# Patient Record
Sex: MALE | Race: WHITE | NOT HISPANIC OR LATINO | Employment: OTHER | URBAN - METROPOLITAN AREA
[De-identification: names, ages, dates, MRNs, and addresses within clinical notes are randomized per-mention and may not be internally consistent; named-entity substitution may affect disease eponyms.]

---

## 2018-03-15 ENCOUNTER — HOSPITAL ENCOUNTER (EMERGENCY)
Facility: HOSPITAL | Age: 53
Discharge: HOME | End: 2018-03-16
Attending: EMERGENCY MEDICINE | Admitting: EMERGENCY MEDICINE
Payer: COMMERCIAL

## 2018-03-15 ENCOUNTER — APPOINTMENT (EMERGENCY)
Dept: CT IMAGING | Facility: HOSPITAL | Age: 53
End: 2018-03-15
Payer: COMMERCIAL

## 2018-03-15 DIAGNOSIS — N20.0 NEPHROLITHIASIS: Primary | ICD-10-CM

## 2018-03-15 LAB
ANION GAP SERPL CALCULATED.3IONS-SCNC: 10 MMOL/L (ref 4–13)
BACTERIA UR QL AUTO: ABNORMAL /HPF
BASOPHILS # BLD AUTO: 0.06 THOUSANDS/ΜL (ref 0–0.1)
BASOPHILS NFR BLD AUTO: 1 % (ref 0–1)
BILIRUB UR QL STRIP: NEGATIVE
BUN SERPL-MCNC: 19 MG/DL (ref 5–25)
CALCIUM SERPL-MCNC: 9.9 MG/DL (ref 8.3–10.1)
CHLORIDE SERPL-SCNC: 106 MMOL/L (ref 100–108)
CLARITY UR: CLEAR
CO2 SERPL-SCNC: 25 MMOL/L (ref 21–32)
COLOR UR: YELLOW
CREAT SERPL-MCNC: 1.03 MG/DL (ref 0.6–1.3)
EOSINOPHIL # BLD AUTO: 0.16 THOUSAND/ΜL (ref 0–0.61)
EOSINOPHIL NFR BLD AUTO: 2 % (ref 0–6)
ERYTHROCYTE [DISTWIDTH] IN BLOOD BY AUTOMATED COUNT: 13 % (ref 11.6–15.1)
GFR SERPL CREATININE-BSD FRML MDRD: 84 ML/MIN/1.73SQ M
GLUCOSE SERPL-MCNC: 109 MG/DL (ref 65–140)
GLUCOSE UR STRIP-MCNC: NEGATIVE MG/DL
HCT VFR BLD AUTO: 42.7 % (ref 36.5–49.3)
HGB BLD-MCNC: 14.5 G/DL (ref 12–17)
HGB UR QL STRIP.AUTO: ABNORMAL
KETONES UR STRIP-MCNC: NEGATIVE MG/DL
LEUKOCYTE ESTERASE UR QL STRIP: NEGATIVE
LYMPHOCYTES # BLD AUTO: 2.04 THOUSANDS/ΜL (ref 0.6–4.47)
LYMPHOCYTES NFR BLD AUTO: 22 % (ref 14–44)
MCH RBC QN AUTO: 30.5 PG (ref 26.8–34.3)
MCHC RBC AUTO-ENTMCNC: 34 G/DL (ref 31.4–37.4)
MCV RBC AUTO: 90 FL (ref 82–98)
MONOCYTES # BLD AUTO: 0.98 THOUSAND/ΜL (ref 0.17–1.22)
MONOCYTES NFR BLD AUTO: 11 % (ref 4–12)
MUCOUS THREADS UR QL AUTO: ABNORMAL
NEUTROPHILS # BLD AUTO: 6 THOUSANDS/ΜL (ref 1.85–7.62)
NEUTS SEG NFR BLD AUTO: 65 % (ref 43–75)
NITRITE UR QL STRIP: NEGATIVE
NON-SQ EPI CELLS URNS QL MICRO: ABNORMAL /HPF
NRBC BLD AUTO-RTO: 0 /100 WBCS
PH UR STRIP.AUTO: 6 [PH] (ref 4.5–8)
PLATELET # BLD AUTO: 266 THOUSANDS/UL (ref 149–390)
PMV BLD AUTO: 8.8 FL (ref 8.9–12.7)
POTASSIUM SERPL-SCNC: 3.7 MMOL/L (ref 3.5–5.3)
PROT UR STRIP-MCNC: ABNORMAL MG/DL
RBC # BLD AUTO: 4.75 MILLION/UL (ref 3.88–5.62)
RBC #/AREA URNS AUTO: ABNORMAL /HPF
SODIUM SERPL-SCNC: 141 MMOL/L (ref 136–145)
SP GR UR STRIP.AUTO: 1.02 (ref 1–1.03)
UROBILINOGEN UR QL STRIP.AUTO: 0.2 E.U./DL
WBC # BLD AUTO: 9.27 THOUSAND/UL (ref 4.31–10.16)
WBC #/AREA URNS AUTO: ABNORMAL /HPF

## 2018-03-15 PROCEDURE — 96361 HYDRATE IV INFUSION ADD-ON: CPT

## 2018-03-15 PROCEDURE — 80048 BASIC METABOLIC PNL TOTAL CA: CPT | Performed by: EMERGENCY MEDICINE

## 2018-03-15 PROCEDURE — 74176 CT ABD & PELVIS W/O CONTRAST: CPT

## 2018-03-15 PROCEDURE — 81001 URINALYSIS AUTO W/SCOPE: CPT | Performed by: EMERGENCY MEDICINE

## 2018-03-15 PROCEDURE — 85025 COMPLETE CBC W/AUTO DIFF WBC: CPT | Performed by: EMERGENCY MEDICINE

## 2018-03-15 PROCEDURE — 36415 COLL VENOUS BLD VENIPUNCTURE: CPT | Performed by: EMERGENCY MEDICINE

## 2018-03-15 PROCEDURE — 96360 HYDRATION IV INFUSION INIT: CPT

## 2018-03-15 RX ORDER — SODIUM CHLORIDE 9 MG/ML
125 INJECTION, SOLUTION INTRAVENOUS CONTINUOUS
Status: DISCONTINUED | OUTPATIENT
Start: 2018-03-15 | End: 2018-03-16 | Stop reason: HOSPADM

## 2018-03-15 RX ORDER — ONDANSETRON 2 MG/ML
4 INJECTION INTRAMUSCULAR; INTRAVENOUS ONCE
Status: DISCONTINUED | OUTPATIENT
Start: 2018-03-15 | End: 2018-03-16 | Stop reason: HOSPADM

## 2018-03-15 RX ORDER — KETOROLAC TROMETHAMINE 30 MG/ML
15 INJECTION, SOLUTION INTRAMUSCULAR; INTRAVENOUS ONCE
Status: DISCONTINUED | OUTPATIENT
Start: 2018-03-15 | End: 2018-03-16 | Stop reason: HOSPADM

## 2018-03-15 RX ADMIN — SODIUM CHLORIDE 125 ML/HR: 0.9 INJECTION, SOLUTION INTRAVENOUS at 22:38

## 2018-03-16 VITALS
HEIGHT: 72 IN | SYSTOLIC BLOOD PRESSURE: 120 MMHG | HEART RATE: 56 BPM | RESPIRATION RATE: 15 BRPM | BODY MASS INDEX: 25.73 KG/M2 | WEIGHT: 190 LBS | TEMPERATURE: 98.9 F | OXYGEN SATURATION: 97 % | DIASTOLIC BLOOD PRESSURE: 81 MMHG

## 2018-03-16 PROCEDURE — 99284 EMERGENCY DEPT VISIT MOD MDM: CPT

## 2018-03-16 RX ORDER — TAMSULOSIN HYDROCHLORIDE 0.4 MG/1
0.4 CAPSULE ORAL
Qty: 5 CAPSULE | Refills: 0 | Status: ON HOLD | OUTPATIENT
Start: 2018-03-16 | End: 2018-04-05 | Stop reason: ALTCHOICE

## 2018-03-16 RX ORDER — IBUPROFEN 600 MG/1
600 TABLET ORAL EVERY 6 HOURS PRN
Qty: 30 TABLET | Refills: 0 | Status: SHIPPED | OUTPATIENT
Start: 2018-03-16 | End: 2018-03-26

## 2018-03-16 RX ORDER — ONDANSETRON 4 MG/1
4 TABLET, FILM COATED ORAL EVERY 8 HOURS PRN
Qty: 12 TABLET | Refills: 0 | Status: ON HOLD | OUTPATIENT
Start: 2018-03-16 | End: 2018-04-05 | Stop reason: ALTCHOICE

## 2018-03-16 RX ORDER — OXYCODONE HYDROCHLORIDE AND ACETAMINOPHEN 5; 325 MG/1; MG/1
1 TABLET ORAL EVERY 4 HOURS PRN
Qty: 12 TABLET | Refills: 0 | Status: SHIPPED | OUTPATIENT
Start: 2018-03-16 | End: 2018-03-26
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
## 2018-03-16 NOTE — ED NOTES
Patient transported to [facility] 02 , [address] [facility]  03/15/18 4163

## 2018-03-16 NOTE — DISCHARGE INSTRUCTIONS
Kidney Stones   WHAT YOU NEED TO KNOW:   Kidney stones form in the urinary system when the water and waste in your urine are out of balance  When this happens, certain types of waste crystals separate from the urine  The crystals build up and form kidney stones  You may have 1 or more kidney stones  DISCHARGE INSTRUCTIONS:   Return to the emergency department if:   · You have vomiting that is not relieved by medicine  Contact your healthcare provider if:   · You have a fever  · You have trouble passing urine  · You see blood in your urine  · You have severe pain  · You have any questions or concerns about your condition or care  Medicines:   · NSAIDs , such as ibuprofen, help decrease swelling, pain, and fever  This medicine is available with or without a doctor's order  NSAIDs can cause stomach bleeding or kidney problems in certain people  If you take blood thinner medicine, always ask your healthcare provider if NSAIDs are safe for you  Always read the medicine label and follow directions  · Prescription medicine  may be given  Ask how to take this medicine safely  · Medicines  to balance your electrolytes may be needed  · Take your medicine as directed  Contact your healthcare provider if you think your medicine is not helping or if you have side effects  Tell him or her if you are allergic to any medicine  Keep a list of the medicines, vitamins, and herbs you take  Include the amounts, and when and why you take them  Bring the list or the pill bottles to follow-up visits  Carry your medicine list with you in case of an emergency  Follow up with your healthcare provider as directed: You may need to return for more tests  Write down your questions so you remember to ask them during your visits  Self-care:   · Drink plenty of liquids  Your healthcare provider may tell you to drink at least 8 to 12 (eight-ounce) cups of liquids each day   This helps flush out the kidney stones when you urinate  Water is the best liquid to drink  · Strain your urine every time you go to the bathroom  Urinate through a strainer or a piece of thin cloth to catch the stones  Take the stones to your healthcare provider so they can be sent to the lab for tests  This will help your healthcare providers plan the best treatment for you  · Eat a variety of healthy foods  Healthy foods include fruits, vegetables, whole-grain breads, low-fat dairy products, beans, and fish  You may need to limit how much sodium (salt) or protein you eat  Ask for information about the best foods for you  · Stay active  Your stones may pass more easily by if you stay active  Ask about the best activities for you  After you pass your kidney stones:  Once you have passed your kidney stones, your healthcare provider may  order a 24-hour urine test  Results from a 24-hour urine test will help your healthcare provider plan ways to prevent more stones from forming  If you are told to do a 24-hour test, your healthcare provider will give you more instructions  © 2017 [facility] [facility] Information is for End User's use only and may not be sold, redistributed or otherwise used for commercial purposes  All illustrations and images included in CareNotes® are the copyrighted property of A D [employer] M [employer]  or [employer]  The above information is an  only  It is not intended as medical advice for individual conditions or treatments  Talk to your doctor, nurse or pharmacist before following any medical regimen to see if it is safe and effective for you  How to Strain Your Urine   WHAT YOU NEED TO KNOW:   Urinate into a strainer (funnel with a fine mesh on the bottom) or glass jar to collect kidney stones  DISCHARGE INSTRUCTIONS:   Medicines:   · Pain medicine: You may be given medicine to take away or decrease pain   Do not wait until the pain is severe before you take your medicine  · NSAIDs:  These medicines decrease swelling, pain, and fever  NSAIDs are available without a doctor's order  Ask which medicine is right for you  Ask how much to take and when to take it  Take as directed  NSAIDs can cause stomach bleeding and kidney problems if not taken correctly  · Nausea medicine: This medicine calms your stomach and prevents or controls vomiting  · Take your medicine as directed  Contact your healthcare provider if you think your medicine is not helping or if you have side effects  Tell him of her if you are allergic to any medicine  Keep a list of the medicines, vitamins, and herbs you take  Include the amounts, and when and why you take them  Bring the list or the pill bottles to follow-up visits  Carry your medicine list with you in case of an emergency  Drink liquids as directed:  Drink about 3 liters of liquids each day, or as directed  That equals about 12 glasses of water or fruit juice  Half of your total daily liquids should be water  Limit coffee, tea, and soda to 2 cups daily  Your urine will be pale and clear if you are drinking enough liquid  Self-care:   · Activity:  Exercise, such as walking, may help decrease your pain  · Avoid heat:  Heat may cause you to sweat, urinate less, and become dehydrated  Follow up with your healthcare provider or urologist as directed:  Write down your questions so you remember to ask them during your visits  Contact your healthcare provider or urologist if:   · You have a fever and chills  · Your urine looks cloudy or has a bad smell  · You have burning pain when you urinate  · You have trouble urinating  · You are vomiting and it does not get better, even after you take medicine  · You have questions or concerns about your condition or care  Return to the emergency department if:   · You are not able to urinate  · You have severe pain in your lower abdomen or side      · Your heart flutters or beats faster than usual   © 2017 [address] [facility] Information is for End User's use only and may not be sold, redistributed or otherwise used for commercial purposes  All illustrations and images included in CareNotes® are the copyrighted property of A D A M , Inc  or [employer]  The above information is an  only  It is not intended as medical advice for individual conditions or treatments  Talk to your doctor, nurse or pharmacist before following any medical regimen to see if it is safe and effective for you

## 2018-03-20 ENCOUNTER — OFFICE VISIT (OUTPATIENT)
Dept: UROLOGY | Facility: CLINIC | Age: 53
End: 2018-03-20
Payer: COMMERCIAL

## 2018-03-20 VITALS
HEART RATE: 62 BPM | BODY MASS INDEX: 26.28 KG/M2 | HEIGHT: 72 IN | SYSTOLIC BLOOD PRESSURE: 126 MMHG | DIASTOLIC BLOOD PRESSURE: 76 MMHG | WEIGHT: 194 LBS

## 2018-03-20 DIAGNOSIS — N20.0 NEPHROLITHIASIS: Primary | ICD-10-CM

## 2018-03-20 LAB
SL AMB  POCT GLUCOSE, UA: ABNORMAL
SL AMB LEUKOCYTE ESTERASE,UA: ABNORMAL
SL AMB POCT BILIRUBIN,UA: ABNORMAL
SL AMB POCT BLOOD,UA: ABNORMAL
SL AMB POCT CLARITY,UA: CLEAR
SL AMB POCT COLOR,UA: ABNORMAL
SL AMB POCT KETONES,UA: ABNORMAL
SL AMB POCT NITRITE,UA: ABNORMAL
SL AMB POCT PH,UA: 5
SL AMB POCT SPECIFIC GRAVITY,UA: 1.03
SL AMB POCT URINE PROTEIN: ABNORMAL
SL AMB POCT UROBILINOGEN: ABNORMAL

## 2018-03-20 PROCEDURE — 81002 URINALYSIS NONAUTO W/O SCOPE: CPT | Performed by: UROLOGY

## 2018-03-20 PROCEDURE — 99244 OFF/OP CNSLTJ NEW/EST MOD 40: CPT | Performed by: UROLOGY

## 2018-03-20 RX ORDER — MULTIVIT WITH MINERALS/LUTEIN
1000 TABLET ORAL
COMMUNITY
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
## 2018-04-02 ENCOUNTER — TELEPHONE (OUTPATIENT)
Dept: UROLOGY | Facility: CLINIC | Age: 53
End: 2018-04-02

## 2018-04-02 DIAGNOSIS — N20.1 URETERAL STONE: Primary | ICD-10-CM

## 2018-04-02 RX ORDER — HYDROCODONE BITARTRATE AND ACETAMINOPHEN 5; 325 MG/1; MG/1
1 TABLET ORAL EVERY 6 HOURS PRN
Qty: 30 TABLET | Refills: 0 | Status: SHIPPED | OUTPATIENT
Start: 2018-04-02 | End: 2018-04-12

## 2018-04-02 NOTE — TELEPHONE ENCOUNTER
Called and spoke to patient and informed of all recommendations as outlined by Ben Brower and advised him script is here in [facility] office for  today  Provided patient with address of the [facility] office and he will call me if unable to  script today

## 2018-04-02 NOTE — TELEPHONE ENCOUNTER
Yes, patient can have prescription for Norco for his breakthrough pain with known obstructing stone  Patient will have to  from [facility] office  Please encourage fluids, NSAIDs/heating pad for mild-mod pain, norco for breakthrough pain, and zofran for nausea  Please provide ER precautions as well  Thank you

## 2018-04-02 NOTE — TELEPHONE ENCOUNTER
Abdi patient,  Returned call from patient,  He is scheduled for surgery for stone on 4/12/18 with Dr Sudhir Martinez, patient is reporting he is having increased intermittent left flank pain which radiates to front of abdomen,  Pain is not relieved by Ibuprofen  He has some intermittent nausea and has a script already for Zofran which he has not had to use  No fever  He is on Flomax  He is calling to see if he can get a script for stronger pain medication  Advised patient I will send message to our physician assistant to review and advised him in narcotic pain reliever is prescribed script would need to be picked up in the [facility] office today as there are no providers in [facility] today  Advised patient I will call him back with recommendation

## 2018-04-04 ENCOUNTER — ANESTHESIA EVENT (OUTPATIENT)
Dept: PERIOP | Facility: HOSPITAL | Age: 53
End: 2018-04-04
Payer: COMMERCIAL

## 2018-04-04 NOTE — PRE-PROCEDURE INSTRUCTIONS
Pre-Surgery Instructions:   Medication Instructions    Ascorbic Acid (VITAMIN C) 1000 MG tablet Patient was instructed by Physician and understands   tamsulosin (FLOMAX) 0 4 mg Patient was instructed by Physician and understands

## 2018-04-04 NOTE — ANESTHESIA PREPROCEDURE EVALUATION
Review of Systems/Medical History  Patient summary reviewed        Cardiovascular  Negative cardio ROS    Pulmonary  Negative pulmonary ROS        GI/Hepatic  Negative GI/hepatic ROS          Kidney stones,        Endo/Other  Negative endo/other ROS      GYN  Negative gynecology ROS          Hematology  Negative hematology ROS      Musculoskeletal  Negative musculoskeletal ROS        Neurology  Negative neurology ROS      Psychology   Negative psychology ROS              Physical Exam    Airway    Mallampati score: II  TM Distance: >3 FB  Neck ROM: full     Dental       Cardiovascular  Comment: Negative ROS, Cardiovascular exam normal    Pulmonary  Pulmonary exam normal     Other Findings        Anesthesia Plan  ASA Score- 2     Anesthesia Type- general with ASA Monitors  Additional Monitors:   Airway Plan:         Plan Factors-    Induction- intravenous  Postoperative Plan-     Informed Consent- Anesthetic plan and risks discussed with patient  I personally reviewed this patient with the CRNA  Discussed and agreed on the Anesthesia Plan with the CRNA  Britany Holm

## 2018-04-05 ENCOUNTER — TELEPHONE (OUTPATIENT)
Dept: UROLOGY | Facility: CLINIC | Age: 53
End: 2018-04-05

## 2018-04-05 ENCOUNTER — ANESTHESIA (OUTPATIENT)
Dept: PERIOP | Facility: HOSPITAL | Age: 53
End: 2018-04-05
Payer: COMMERCIAL

## 2018-04-05 ENCOUNTER — APPOINTMENT (OUTPATIENT)
Dept: RADIOLOGY | Facility: HOSPITAL | Age: 53
End: 2018-04-05
Payer: COMMERCIAL

## 2018-04-05 ENCOUNTER — HOSPITAL ENCOUNTER (OUTPATIENT)
Facility: HOSPITAL | Age: 53
Setting detail: OUTPATIENT SURGERY
Discharge: HOME | End: 2018-04-05
Attending: UROLOGY | Admitting: UROLOGY
Payer: COMMERCIAL

## 2018-04-05 VITALS
WEIGHT: 193 LBS | RESPIRATION RATE: 20 BRPM | HEART RATE: 69 BPM | HEIGHT: 72 IN | OXYGEN SATURATION: 97 % | DIASTOLIC BLOOD PRESSURE: 69 MMHG | SYSTOLIC BLOOD PRESSURE: 125 MMHG | BODY MASS INDEX: 26.14 KG/M2 | TEMPERATURE: 96.9 F

## 2018-04-05 DIAGNOSIS — N20.0 NEPHROLITHIASIS: ICD-10-CM

## 2018-04-05 DIAGNOSIS — N20.0 KIDNEY STONE: Primary | ICD-10-CM

## 2018-04-05 PROCEDURE — 82360 CALCULUS ASSAY QUANT: CPT | Performed by: UROLOGY

## 2018-04-05 PROCEDURE — 74420 UROGRAPHY RTRGR +-KUB: CPT

## 2018-04-05 PROCEDURE — C1769 GUIDE WIRE: HCPCS | Performed by: UROLOGY

## 2018-04-05 PROCEDURE — 52356 CYSTO/URETERO W/LITHOTRIPSY: CPT | Performed by: UROLOGY

## 2018-04-05 PROCEDURE — C2617 STENT, NON-COR, TEM W/O DEL: HCPCS | Performed by: UROLOGY

## 2018-04-05 DEVICE — INLAY OPTIMA URETERAL STENT W/O GUIDEWIRE
Type: IMPLANTABLE DEVICE | Site: URETER | Status: FUNCTIONAL
Brand: BARD® INLAY OPTIMA® URETERAL STENT

## 2018-04-05 RX ORDER — PHENAZOPYRIDINE HYDROCHLORIDE 200 MG/1
200 TABLET, FILM COATED ORAL 3 TIMES DAILY PRN
Qty: 10 TABLET | Refills: 0 | Status: SHIPPED | OUTPATIENT
Start: 2018-04-05 | End: 2018-04-08

## 2018-04-05 RX ORDER — EPHEDRINE SULFATE 50 MG/ML
INJECTION, SOLUTION INTRAVENOUS AS NEEDED
Status: DISCONTINUED | OUTPATIENT
Start: 2018-04-05 | End: 2018-04-05 | Stop reason: SURG

## 2018-04-05 RX ORDER — FENTANYL CITRATE/PF 50 MCG/ML
25 SYRINGE (ML) INJECTION
Status: DISCONTINUED | OUTPATIENT
Start: 2018-04-05 | End: 2018-04-05 | Stop reason: HOSPADM

## 2018-04-05 RX ORDER — HYDROCODONE BITARTRATE AND ACETAMINOPHEN 5; 325 MG/1; MG/1
1 TABLET ORAL EVERY 6 HOURS PRN
Qty: 10 TABLET | Refills: 0 | Status: SHIPPED | OUTPATIENT
Start: 2018-04-05 | End: 2018-04-15

## 2018-04-05 RX ORDER — FENTANYL CITRATE 50 UG/ML
INJECTION, SOLUTION INTRAMUSCULAR; INTRAVENOUS AS NEEDED
Status: DISCONTINUED | OUTPATIENT
Start: 2018-04-05 | End: 2018-04-05 | Stop reason: SURG

## 2018-04-05 RX ORDER — GLYCOPYRROLATE 0.2 MG/ML
INJECTION INTRAMUSCULAR; INTRAVENOUS AS NEEDED
Status: DISCONTINUED | OUTPATIENT
Start: 2018-04-05 | End: 2018-04-05 | Stop reason: SURG

## 2018-04-05 RX ORDER — TAMSULOSIN HYDROCHLORIDE 0.4 MG/1
0.4 CAPSULE ORAL
Qty: 15 CAPSULE | Refills: 0 | Status: SHIPPED | OUTPATIENT
Start: 2018-04-05

## 2018-04-05 RX ORDER — MAGNESIUM HYDROXIDE 1200 MG/15ML
LIQUID ORAL AS NEEDED
Status: DISCONTINUED | OUTPATIENT
Start: 2018-04-05 | End: 2018-04-05 | Stop reason: HOSPADM

## 2018-04-05 RX ORDER — ONDANSETRON 2 MG/ML
4 INJECTION INTRAMUSCULAR; INTRAVENOUS ONCE AS NEEDED
Status: DISCONTINUED | OUTPATIENT
Start: 2018-04-05 | End: 2018-04-05 | Stop reason: HOSPADM

## 2018-04-05 RX ORDER — ONDANSETRON 2 MG/ML
INJECTION INTRAMUSCULAR; INTRAVENOUS AS NEEDED
Status: DISCONTINUED | OUTPATIENT
Start: 2018-04-05 | End: 2018-04-05 | Stop reason: SURG

## 2018-04-05 RX ORDER — DEXMEDETOMIDINE HYDROCHLORIDE 100 UG/ML
INJECTION, SOLUTION INTRAVENOUS AS NEEDED
Status: DISCONTINUED | OUTPATIENT
Start: 2018-04-05 | End: 2018-04-05 | Stop reason: SURG

## 2018-04-05 RX ORDER — PROPOFOL 10 MG/ML
INJECTION, EMULSION INTRAVENOUS AS NEEDED
Status: DISCONTINUED | OUTPATIENT
Start: 2018-04-05 | End: 2018-04-05 | Stop reason: SURG

## 2018-04-05 RX ORDER — IBUPROFEN 200 MG
600 TABLET ORAL EVERY 6 HOURS PRN
Refills: 0
Start: 2018-04-05 | End: 2021-11-16 | Stop reason: ALTCHOICE

## 2018-04-05 RX ORDER — CEFAZOLIN SODIUM 1 G/3ML
INJECTION, POWDER, FOR SOLUTION INTRAMUSCULAR; INTRAVENOUS AS NEEDED
Status: DISCONTINUED | OUTPATIENT
Start: 2018-04-05 | End: 2018-04-05

## 2018-04-05 RX ORDER — LIDOCAINE HYDROCHLORIDE 10 MG/ML
INJECTION, SOLUTION INFILTRATION; PERINEURAL AS NEEDED
Status: DISCONTINUED | OUTPATIENT
Start: 2018-04-05 | End: 2018-04-05 | Stop reason: SURG

## 2018-04-05 RX ORDER — MIDAZOLAM HYDROCHLORIDE 1 MG/ML
INJECTION INTRAMUSCULAR; INTRAVENOUS AS NEEDED
Status: DISCONTINUED | OUTPATIENT
Start: 2018-04-05 | End: 2018-04-05 | Stop reason: SURG

## 2018-04-05 RX ORDER — DOCUSATE SODIUM 100 MG/1
100 CAPSULE, LIQUID FILLED ORAL 2 TIMES DAILY
Qty: 30 CAPSULE | Refills: 0 | Status: SHIPPED | OUTPATIENT
Start: 2018-04-05 | End: 2021-11-16 | Stop reason: ALTCHOICE

## 2018-04-05 RX ORDER — SODIUM CHLORIDE, SODIUM LACTATE, POTASSIUM CHLORIDE, CALCIUM CHLORIDE 600; 310; 30; 20 MG/100ML; MG/100ML; MG/100ML; MG/100ML
INJECTION, SOLUTION INTRAVENOUS CONTINUOUS PRN
Status: DISCONTINUED | OUTPATIENT
Start: 2018-04-05 | End: 2018-04-05 | Stop reason: SURG

## 2018-04-05 RX ADMIN — FENTANYL CITRATE 25 MCG: 50 INJECTION, SOLUTION INTRAMUSCULAR; INTRAVENOUS at 07:35

## 2018-04-05 RX ADMIN — DEXAMETHASONE SODIUM PHOSPHATE 10 MG: 10 INJECTION INTRAMUSCULAR; INTRAVENOUS at 07:25

## 2018-04-05 RX ADMIN — PROPOFOL 200 MG: 10 INJECTION, EMULSION INTRAVENOUS at 07:25

## 2018-04-05 RX ADMIN — ONDANSETRON 4 MG: 2 INJECTION INTRAMUSCULAR; INTRAVENOUS at 07:25

## 2018-04-05 RX ADMIN — GLYCOPYRROLATE 0.4 MG: 0.2 INJECTION, SOLUTION INTRAMUSCULAR; INTRAVENOUS at 07:45

## 2018-04-05 RX ADMIN — FENTANYL CITRATE 25 MCG: 50 INJECTION, SOLUTION INTRAMUSCULAR; INTRAVENOUS at 08:30

## 2018-04-05 RX ADMIN — CEFAZOLIN SODIUM 2000 MG: 2 SOLUTION INTRAVENOUS at 07:19

## 2018-04-05 RX ADMIN — EPHEDRINE SULFATE 10 MG: 50 INJECTION, SOLUTION INTRAMUSCULAR; INTRAVENOUS; SUBCUTANEOUS at 07:45

## 2018-04-05 RX ADMIN — DEXMEDETOMIDINE 20 MCG: 100 INJECTION, SOLUTION, CONCENTRATE INTRAVENOUS at 07:28

## 2018-04-05 RX ADMIN — FENTANYL CITRATE 25 MCG: 50 INJECTION, SOLUTION INTRAMUSCULAR; INTRAVENOUS at 08:15

## 2018-04-05 RX ADMIN — FENTANYL CITRATE 25 MCG: 50 INJECTION, SOLUTION INTRAMUSCULAR; INTRAVENOUS at 07:30

## 2018-04-05 RX ADMIN — MIDAZOLAM 2 MG: 1 INJECTION INTRAMUSCULAR; INTRAVENOUS at 07:20

## 2018-04-05 RX ADMIN — SODIUM CHLORIDE, SODIUM LACTATE, POTASSIUM CHLORIDE, AND CALCIUM CHLORIDE: .6; .31; .03; .02 INJECTION, SOLUTION INTRAVENOUS at 07:18

## 2018-04-05 RX ADMIN — LIDOCAINE HYDROCHLORIDE 50 MG: 10 INJECTION, SOLUTION INFILTRATION; PERINEURAL at 07:25

## 2018-04-05 RX ADMIN — EPHEDRINE SULFATE 10 MG: 50 INJECTION, SOLUTION INTRAMUSCULAR; INTRAVENOUS; SUBCUTANEOUS at 07:50

## 2018-04-05 NOTE — ANESTHESIA POSTPROCEDURE EVALUATION
Post-Op Assessment Note      CV Status:  Stable    Mental Status:  Alert and awake    Hydration Status:  Euvolemic    PONV Controlled:  Controlled    Airway Patency:  Patent    Post Op Vitals Reviewed: Yes          Staff: CRNA           BP   127/82   Temp      Pulse 93   Resp   18   SpO2   97%

## 2018-04-05 NOTE — OP NOTE
Operative Note     PATIENT:  Karan Crowe (MRN [id number])    DATE OF PROCEDURE:   4/5/2018    PRE-OP DIAGNOSIS:   1) Left ureteral calculus    POST-OP DIAGNOSIS:   1) Left ureteral calculus (impacted)    PROCEDURES PERFORMED:  1) Cystoscopy  2) Left retrograde pyelography with fluoroscopic interpretation  3) Left ureteroscopy with laser lithotripsy and basket extraction of stone  4) Left ureteral stent placement     SURGEON:  Silviano Roper MD    NOTE:  Cefazolin    There were no qualified teaching residents to assist with this case    ANESTHESIA: General     COMPLICATIONS:   None    ANTIBIOTICS:      INTRAOPERATIVE THROMBOEMBOLISM PROPHYLAXIS:  Pneumatic compression stockings     FINDINGS:  Mildly impacted left proximal ureteral calculus removed completely endoscopically, postoperative ureteral stent left in place on a string    INDICATIONS FOR PROCEDURE:  Karan Crowe is an 47 y o  old male with Left ureteral calculus  After discussing the options, the patient elected to undergo ureteroscopy and ureteral stent placement  We discussed the procedure in detail, the alternatives, and the risks, and they signed informed consent to proceed  PROCEDURE IN DETAIL:   The patient was identified and brought to the OR  Antibiotic prophylaxis and DVT prophylaxis were administered  They were placed in the comfortable dorsal lithotomy position with care to pad all pressure points  They were prepped and draped in the usual sterile fashion using hibiclens  A surgical time out was performed with all in the room in agreement with the correct patient, procedure, indications, and laterality  A 21-Portuguese rigid cystoscope was used to enter the bladder  The bladder was inspected in its entirety and there were no lesions noted  The ureteral orifices were identified in their normal orthotopic positions  The Left ureteral orifice was identified and a 5 Fr open ended catheter was placed into the ureteral orifice  A retrograde pyelogram was performed with the findings as described above  A Sensor wire was advanced up to the kidney under fluoroscopic guidance  Leaving this safety wire in place, the bladder was drained  A   7 5 Haitian semi-rigid ureteroscope was advanced up the ureter under vision   The stone was encountered in the proximal ureter  The stone was noted to be impacted  A holmium laser fiber was passed through the ureteroscope and laser lithotripsy was commenced at settings of 0 7 J and 7 Hz  The stones were fragmented to very small pieces  Once we were satisfied that the stone was fragmented to dust, a 1 9 Western zulily zero tip nitinol basket was passed through the ureteroscope  The residual fragments were basketed out and removed  The ureteroscope was backed down the ureter under vision and there were no residual fragments and the ureter was noted to be intact with no injury and moderate edema where the stone was located  A JJ stent was then passed up the wire  under fluoroscopic guidance into the kidney with a good curl noted in the kidney and in the bladder  An externalized tethering string was left in place and secured to the skin  The bladder was drained  The patient was placed back supine, awakened from general anesthesia and brought to recovery room in stable condition  ESTIMATED BLOOD LOSS:  Minimal      SPECIMENS:     Order Name Source Comment Collection Info Order Time   STONE ANALYSIS Ureter, Left  Collected By: Ellie Bains MD 4/5/2018  7:49 AM        IMPLANTS:     Implant Name Type Inv  Item Serial No   Lot No  LRB No  Used   URETERAL STENT 6 FR X 24 CM OPTIMA INLAY - [id number]   URETERAL STENT 6 FR X 24 CM OPTIMA INLAY   [employer] [id number] Left 1        COMPLICATIONS: None    DISPOSITION: PACU    PLAN:  Patient will be instructed to remove his stent at home or in the office this coming Tuesday    He will then follow up in 2-3 months with a postoperative KUB and renal ultrasound
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
## 2018-04-05 NOTE — TELEPHONE ENCOUNTER
Patient is s/p Left URS/ left stent with string 4/5/18 with Dr Dora Mazariegos,  Per Dr Dora Mazariegos, patient has been instructed to remove his stent at home on Tuesday 4/10/18 and patient to be scheduled for post op follow up in 2 to 3 months with KUB/US ptv  LM for patient to call office

## 2018-04-06 NOTE — TELEPHONE ENCOUNTER
Spoke to patient, reviewed stent management and stent removal instructions  Patient will remove stent at home on Tuesday 4/10/18  Patient scheduled for follow up on 6/13/18 with Ben Brower at 8 am [facility], with KUB/US ptv  Scripts mailed to patient with appt card and patient aware to call central scheduling to schedule ultrasound

## 2018-04-06 NOTE — TELEPHONE ENCOUNTER
Patient left message returning my call  Attempted to reach him back and got voicemail, request he call back

## 2018-04-17 LAB
CA PHOS MFR STONE: 5 %
CALCIUM OXALATE DIHYDRATE MFR STONE IR: 40 %
COLOR STONE: NORMAL
COM MFR STONE: 55 %
COMMENT-STONE3: NORMAL
COMPOSITION: NORMAL
LABORATORY COMMENT REPORT: NORMAL
NIDUS STONE QL: NORMAL
PHOTO: NORMAL
SIZE STONE: NORMAL MM
STONE ANALYSIS-IMP: NORMAL
WT STONE: 38 MG

## 2018-07-02 NOTE — INTERVAL H&P NOTE
H&P reviewed  After examining the patient I find no changes in the patients condition since the H&P had been written  regular

## 2021-10-22 ENCOUNTER — APPOINTMENT (OUTPATIENT)
Dept: LAB | Facility: CLINIC | Age: 56
End: 2021-10-22
Payer: COMMERCIAL

## 2021-10-22 ENCOUNTER — APPOINTMENT (OUTPATIENT)
Dept: RADIOLOGY | Facility: CLINIC | Age: 56
End: 2021-10-22
Payer: COMMERCIAL

## 2021-10-22 ENCOUNTER — OFFICE VISIT (OUTPATIENT)
Dept: FAMILY MEDICINE CLINIC | Facility: CLINIC | Age: 56
End: 2021-10-22
Payer: COMMERCIAL

## 2021-10-22 VITALS
HEIGHT: 72 IN | HEART RATE: 75 BPM | WEIGHT: 182 LBS | SYSTOLIC BLOOD PRESSURE: 102 MMHG | DIASTOLIC BLOOD PRESSURE: 70 MMHG | OXYGEN SATURATION: 97 % | BODY MASS INDEX: 24.65 KG/M2 | TEMPERATURE: 98.5 F

## 2021-10-22 DIAGNOSIS — G89.29 CHRONIC PAIN OF BOTH KNEES: ICD-10-CM

## 2021-10-22 DIAGNOSIS — Z12.11 SCREEN FOR COLON CANCER: ICD-10-CM

## 2021-10-22 DIAGNOSIS — Z78.9 HISTORY OF MOTORCYCLE ACCIDENT: ICD-10-CM

## 2021-10-22 DIAGNOSIS — M79.10 MYALGIA: ICD-10-CM

## 2021-10-22 DIAGNOSIS — M25.561 CHRONIC PAIN OF BOTH KNEES: ICD-10-CM

## 2021-10-22 DIAGNOSIS — M25.50 POLYARTHRALGIA: Primary | ICD-10-CM

## 2021-10-22 DIAGNOSIS — M25.562 CHRONIC PAIN OF BOTH KNEES: ICD-10-CM

## 2021-10-22 DIAGNOSIS — M25.50 POLYARTHRALGIA: ICD-10-CM

## 2021-10-22 DIAGNOSIS — G89.29 OTHER CHRONIC PAIN: ICD-10-CM

## 2021-10-22 LAB
CK SERPL-CCNC: 87 U/L (ref 39–308)
CRP SERPL QL: 35 MG/L

## 2021-10-22 PROCEDURE — 86618 LYME DISEASE ANTIBODY: CPT

## 2021-10-22 PROCEDURE — 99204 OFFICE O/P NEW MOD 45 MIN: CPT | Performed by: PHYSICIAN ASSISTANT

## 2021-10-22 PROCEDURE — 36415 COLL VENOUS BLD VENIPUNCTURE: CPT

## 2021-10-22 PROCEDURE — 86140 C-REACTIVE PROTEIN: CPT

## 2021-10-22 PROCEDURE — 73562 X-RAY EXAM OF KNEE 3: CPT

## 2021-10-22 PROCEDURE — 82550 ASSAY OF CK (CPK): CPT

## 2021-10-22 RX ORDER — MELOXICAM 15 MG/1
15 TABLET ORAL DAILY
Qty: 30 TABLET | Refills: 5 | Status: SHIPPED | OUTPATIENT
Start: 2021-10-22 | End: 2022-03-25 | Stop reason: SDUPTHER

## 2021-10-22 RX ORDER — ASPIRIN 81 MG/1
81 TABLET, CHEWABLE ORAL DAILY
COMMUNITY

## 2021-10-23 LAB — B BURGDOR IGG+IGM SER-ACNC: 19

## 2021-10-26 DIAGNOSIS — R79.82 ELEVATED C-REACTIVE PROTEIN (CRP): Primary | ICD-10-CM

## 2021-10-26 DIAGNOSIS — M79.10 MYALGIA: ICD-10-CM

## 2021-10-26 DIAGNOSIS — M25.50 POLYARTHRALGIA: ICD-10-CM

## 2021-11-08 ENCOUNTER — TELEPHONE (OUTPATIENT)
Dept: FAMILY MEDICINE CLINIC | Facility: CLINIC | Age: 56
End: 2021-11-08

## 2021-11-08 DIAGNOSIS — M25.50 POLYARTHRALGIA: ICD-10-CM

## 2021-11-08 DIAGNOSIS — R79.82 ELEVATED C-REACTIVE PROTEIN (CRP): ICD-10-CM

## 2021-11-08 DIAGNOSIS — M17.0 ARTHRITIS OF BOTH KNEES: Primary | ICD-10-CM

## 2021-11-09 LAB — COLOGUARD RESULT REPORTABLE: NEGATIVE

## 2021-11-16 ENCOUNTER — APPOINTMENT (OUTPATIENT)
Dept: RADIOLOGY | Facility: CLINIC | Age: 56
End: 2021-11-16
Payer: COMMERCIAL

## 2021-11-16 ENCOUNTER — CONSULT (OUTPATIENT)
Dept: OBGYN CLINIC | Facility: CLINIC | Age: 56
End: 2021-11-16
Payer: COMMERCIAL

## 2021-11-16 VITALS
HEIGHT: 72 IN | HEART RATE: 59 BPM | DIASTOLIC BLOOD PRESSURE: 78 MMHG | BODY MASS INDEX: 24.92 KG/M2 | RESPIRATION RATE: 18 BRPM | SYSTOLIC BLOOD PRESSURE: 110 MMHG | WEIGHT: 184 LBS

## 2021-11-16 DIAGNOSIS — M17.0 ARTHRITIS OF BOTH KNEES: ICD-10-CM

## 2021-11-16 PROCEDURE — 99243 OFF/OP CNSLTJ NEW/EST LOW 30: CPT | Performed by: ORTHOPAEDIC SURGERY

## 2021-11-16 PROCEDURE — 73560 X-RAY EXAM OF KNEE 1 OR 2: CPT

## 2021-11-26 ENCOUNTER — OFFICE VISIT (OUTPATIENT)
Dept: FAMILY MEDICINE CLINIC | Facility: CLINIC | Age: 56
End: 2021-11-26
Payer: COMMERCIAL

## 2021-11-26 VITALS
HEIGHT: 72 IN | BODY MASS INDEX: 24.79 KG/M2 | DIASTOLIC BLOOD PRESSURE: 64 MMHG | OXYGEN SATURATION: 100 % | WEIGHT: 183 LBS | HEART RATE: 68 BPM | SYSTOLIC BLOOD PRESSURE: 108 MMHG | TEMPERATURE: 97.1 F

## 2021-11-26 DIAGNOSIS — M25.512 CHRONIC PAIN OF BOTH SHOULDERS: ICD-10-CM

## 2021-11-26 DIAGNOSIS — M25.50 POLYARTHRALGIA: ICD-10-CM

## 2021-11-26 DIAGNOSIS — M17.0 ARTHRITIS OF BOTH KNEES: ICD-10-CM

## 2021-11-26 DIAGNOSIS — M54.50 CHRONIC BILATERAL LOW BACK PAIN WITHOUT SCIATICA: ICD-10-CM

## 2021-11-26 DIAGNOSIS — M25.511 CHRONIC PAIN OF BOTH SHOULDERS: ICD-10-CM

## 2021-11-26 DIAGNOSIS — G89.29 CHRONIC BILATERAL LOW BACK PAIN WITHOUT SCIATICA: ICD-10-CM

## 2021-11-26 DIAGNOSIS — G89.29 CHRONIC PAIN OF BOTH SHOULDERS: ICD-10-CM

## 2021-11-26 DIAGNOSIS — G89.4 CHRONIC PAIN SYNDROME: Primary | ICD-10-CM

## 2021-11-26 PROCEDURE — 99214 OFFICE O/P EST MOD 30 MIN: CPT | Performed by: PHYSICIAN ASSISTANT

## 2021-11-26 RX ORDER — DULOXETIN HYDROCHLORIDE 20 MG/1
20 CAPSULE, DELAYED RELEASE ORAL DAILY
Qty: 30 CAPSULE | Refills: 0 | Status: SHIPPED | OUTPATIENT
Start: 2021-11-26 | End: 2022-01-25 | Stop reason: SDUPTHER

## 2021-12-29 ENCOUNTER — OFFICE VISIT (OUTPATIENT)
Dept: FAMILY MEDICINE CLINIC | Facility: CLINIC | Age: 56
End: 2021-12-29
Payer: COMMERCIAL

## 2021-12-29 VITALS
HEIGHT: 72 IN | WEIGHT: 185 LBS | OXYGEN SATURATION: 97 % | BODY MASS INDEX: 25.06 KG/M2 | SYSTOLIC BLOOD PRESSURE: 116 MMHG | HEART RATE: 72 BPM | DIASTOLIC BLOOD PRESSURE: 71 MMHG | TEMPERATURE: 97.1 F

## 2021-12-29 DIAGNOSIS — G89.29 CHRONIC BILATERAL LOW BACK PAIN WITHOUT SCIATICA: Primary | ICD-10-CM

## 2021-12-29 DIAGNOSIS — M17.0 ARTHRITIS OF BOTH KNEES: ICD-10-CM

## 2021-12-29 DIAGNOSIS — Z13.1 SCREENING FOR DIABETES MELLITUS: ICD-10-CM

## 2021-12-29 DIAGNOSIS — M25.511 CHRONIC PAIN OF BOTH SHOULDERS: ICD-10-CM

## 2021-12-29 DIAGNOSIS — M25.512 CHRONIC PAIN OF BOTH SHOULDERS: ICD-10-CM

## 2021-12-29 DIAGNOSIS — G89.4 CHRONIC PAIN SYNDROME: ICD-10-CM

## 2021-12-29 DIAGNOSIS — M25.50 POLYARTHRALGIA: ICD-10-CM

## 2021-12-29 DIAGNOSIS — M54.50 CHRONIC BILATERAL LOW BACK PAIN WITHOUT SCIATICA: Primary | ICD-10-CM

## 2021-12-29 DIAGNOSIS — G89.29 CHRONIC PAIN OF BOTH SHOULDERS: ICD-10-CM

## 2021-12-29 DIAGNOSIS — E78.2 MIXED HYPERLIPIDEMIA: ICD-10-CM

## 2021-12-29 PROCEDURE — 99214 OFFICE O/P EST MOD 30 MIN: CPT | Performed by: PHYSICIAN ASSISTANT

## 2021-12-29 RX ORDER — OMEGA-3-ACID ETHYL ESTERS 1 G/1
2 CAPSULE, LIQUID FILLED ORAL 2 TIMES DAILY
Qty: 360 CAPSULE | Refills: 0 | Status: SHIPPED | OUTPATIENT
Start: 2021-12-29 | End: 2022-06-30 | Stop reason: SDUPTHER

## 2022-01-25 DIAGNOSIS — G89.4 CHRONIC PAIN SYNDROME: ICD-10-CM

## 2022-01-25 RX ORDER — DULOXETIN HYDROCHLORIDE 20 MG/1
20 CAPSULE, DELAYED RELEASE ORAL DAILY
Qty: 30 CAPSULE | Refills: 0 | Status: SHIPPED | OUTPATIENT
Start: 2022-01-25 | End: 2022-02-24 | Stop reason: SDUPTHER

## 2022-02-24 DIAGNOSIS — G89.4 CHRONIC PAIN SYNDROME: ICD-10-CM

## 2022-02-24 RX ORDER — DULOXETIN HYDROCHLORIDE 20 MG/1
20 CAPSULE, DELAYED RELEASE ORAL DAILY
Qty: 30 CAPSULE | Refills: 0 | Status: SHIPPED | OUTPATIENT
Start: 2022-02-24 | End: 2022-03-29 | Stop reason: SDUPTHER

## 2022-03-25 DIAGNOSIS — M25.50 POLYARTHRALGIA: ICD-10-CM

## 2022-03-25 DIAGNOSIS — G89.29 OTHER CHRONIC PAIN: ICD-10-CM

## 2022-03-25 DIAGNOSIS — M79.10 MYALGIA: ICD-10-CM

## 2022-03-25 DIAGNOSIS — M25.562 CHRONIC PAIN OF BOTH KNEES: ICD-10-CM

## 2022-03-25 DIAGNOSIS — G89.29 CHRONIC PAIN OF BOTH KNEES: ICD-10-CM

## 2022-03-25 DIAGNOSIS — M25.561 CHRONIC PAIN OF BOTH KNEES: ICD-10-CM

## 2022-03-25 RX ORDER — MELOXICAM 15 MG/1
15 TABLET ORAL DAILY
Qty: 30 TABLET | Refills: 5 | Status: SHIPPED | OUTPATIENT
Start: 2022-03-25 | End: 2022-04-25 | Stop reason: SDUPTHER

## 2022-03-29 DIAGNOSIS — G89.4 CHRONIC PAIN SYNDROME: ICD-10-CM

## 2022-03-29 RX ORDER — DULOXETIN HYDROCHLORIDE 20 MG/1
20 CAPSULE, DELAYED RELEASE ORAL DAILY
Qty: 30 CAPSULE | Refills: 0 | Status: SHIPPED | OUTPATIENT
Start: 2022-03-29 | End: 2022-04-27 | Stop reason: SDUPTHER

## 2022-03-31 ENCOUNTER — APPOINTMENT (OUTPATIENT)
Dept: LAB | Facility: CLINIC | Age: 57
End: 2022-03-31
Payer: COMMERCIAL

## 2022-03-31 DIAGNOSIS — E78.2 MIXED HYPERLIPIDEMIA: ICD-10-CM

## 2022-03-31 DIAGNOSIS — Z13.1 SCREENING FOR DIABETES MELLITUS: ICD-10-CM

## 2022-03-31 LAB
ALBUMIN SERPL BCP-MCNC: 4 G/DL (ref 3.5–5)
ALP SERPL-CCNC: 78 U/L (ref 46–116)
ALT SERPL W P-5'-P-CCNC: 31 U/L (ref 12–78)
ANION GAP SERPL CALCULATED.3IONS-SCNC: 2 MMOL/L (ref 4–13)
AST SERPL W P-5'-P-CCNC: 24 U/L (ref 5–45)
BILIRUB SERPL-MCNC: 0.73 MG/DL (ref 0.2–1)
BUN SERPL-MCNC: 26 MG/DL (ref 5–25)
CALCIUM SERPL-MCNC: 9.7 MG/DL (ref 8.3–10.1)
CHLORIDE SERPL-SCNC: 110 MMOL/L (ref 100–108)
CHOLEST SERPL-MCNC: 288 MG/DL
CO2 SERPL-SCNC: 30 MMOL/L (ref 21–32)
CREAT SERPL-MCNC: 0.96 MG/DL (ref 0.6–1.3)
GFR SERPL CREATININE-BSD FRML MDRD: 88 ML/MIN/1.73SQ M
GLUCOSE P FAST SERPL-MCNC: 92 MG/DL (ref 65–99)
HDLC SERPL-MCNC: 56 MG/DL
LDLC SERPL CALC-MCNC: 209 MG/DL (ref 0–100)
POTASSIUM SERPL-SCNC: 4.3 MMOL/L (ref 3.5–5.3)
PROT SERPL-MCNC: 7.2 G/DL (ref 6.4–8.2)
SODIUM SERPL-SCNC: 142 MMOL/L (ref 136–145)
TRIGL SERPL-MCNC: 115 MG/DL

## 2022-03-31 PROCEDURE — 80061 LIPID PANEL: CPT

## 2022-03-31 PROCEDURE — 80053 COMPREHEN METABOLIC PANEL: CPT

## 2022-03-31 PROCEDURE — 36415 COLL VENOUS BLD VENIPUNCTURE: CPT

## 2022-04-01 ENCOUNTER — OFFICE VISIT (OUTPATIENT)
Dept: FAMILY MEDICINE CLINIC | Facility: CLINIC | Age: 57
End: 2022-04-01
Payer: COMMERCIAL

## 2022-04-01 VITALS
HEART RATE: 64 BPM | HEIGHT: 72 IN | TEMPERATURE: 97.7 F | OXYGEN SATURATION: 97 % | BODY MASS INDEX: 25.65 KG/M2 | WEIGHT: 189.4 LBS | SYSTOLIC BLOOD PRESSURE: 136 MMHG | DIASTOLIC BLOOD PRESSURE: 86 MMHG

## 2022-04-01 DIAGNOSIS — E78.2 MIXED HYPERLIPIDEMIA: Primary | ICD-10-CM

## 2022-04-01 PROCEDURE — 99214 OFFICE O/P EST MOD 30 MIN: CPT | Performed by: PHYSICIAN ASSISTANT

## 2022-04-01 RX ORDER — ATORVASTATIN CALCIUM 20 MG/1
20 TABLET, FILM COATED ORAL DAILY
Qty: 90 TABLET | Refills: 0 | Status: SHIPPED | OUTPATIENT
Start: 2022-04-01 | End: 2022-06-30 | Stop reason: SDUPTHER

## 2022-04-01 NOTE — PROGRESS NOTES
Assessment/Plan:       Problem List Items Addressed This Visit        Other    Mixed hyperlipidemia - Primary    Relevant Medications    atorvastatin (LIPITOR) 20 mg tablet    Other Relevant Orders    Comprehensive metabolic panel    Lipid Panel with Direct LDL reflex        after discussion of risks and benefits pt agreeable to starting lipitor  Will improve diet/exercise and will repeat labs and follow up in 3 months, earlier prn     Subjective:      Patient ID: Karin Ayon is a 55 y o  male      Pt presents for follow up and lab review    Labs as below    He has not been on statin, he is on lovaza  He feels well without acute concerns  He continues with vascular as he is s/p traumatic aortic disection with endovascular repair in 2019    Recent Results (from the past 672 hour(s))  -Lipid Panel with Direct LDL reflex:   Collection Time: 03/31/22  7:37 AM       Result                      Value             Ref Range           Cholesterol                 288 (H)           See Comment *       Triglycerides               115               See Comment *       HDL, Direct                 56                >=40 mg/dL          LDL Calculated              209 (H)           0 - 100 mg/dL  -Comprehensive metabolic panel:   Collection Time: 03/31/22  7:37 AM       Result                      Value             Ref Range           Sodium                      142               136 - 145 mm*       Potassium                   4 3               3 5 - 5 3 mm*       Chloride                    110 (H)           100 - 108 mm*       CO2                         30                21 - 32 mmol*       ANION GAP                   2 (L)             4 - 13 mmol/L       BUN                         26 (H)            5 - 25 mg/dL        Creatinine                  0 96              0 60 - 1 30 *       Glucose, Fasting            92                65 - 99 mg/dL       Calcium                     9 7               8 3 - 10 1 m*       AST 24                5 - 45 U/L          ALT                         31                12 - 78 U/L         Alkaline Phosphatase        78                46 - 116 U/L        Total Protein               7 2               6 4 - 8 2 g/*       Albumin                     4 0               3 5 - 5 0 g/*       Total Bilirubin             0 73              0 20 - 1 00 *       eGFR                        88                ml/min/1 73s*        The following portions of the patient's history were reviewed and updated as appropriate:   He  has a past medical history of Chronic kidney disease and Kidney stone  He   Patient Active Problem List    Diagnosis Date Noted    Mixed hyperlipidemia 12/29/2021    Chronic bilateral low back pain without sciatica 11/26/2021    Chronic pain of both shoulders 11/26/2021    Polyarthralgia 11/26/2021    Arthritis of both knees 11/26/2021    Chronic pain syndrome 11/26/2021    Nephrolithiasis 03/20/2018     He  has a past surgical history that includes Knee arthroscopy w/ ACL reconstruction; Finger amputation (Left); Nasal septum surgery (Bilateral); Knee arthroscopy (Left); Fracture surgery (Left); and pr cysto/uretero w/lithotripsy &indwell stent insrt (Left, 4/5/2018)  His family history includes Diabetes in his father and mother  He  reports that he has never smoked  He has never used smokeless tobacco  He reports that he does not drink alcohol and does not use drugs    Current Outpatient Medications   Medication Sig Dispense Refill    Apple Cider Vinegar 188 MG CAPS Take 2 capsules by mouth daily      Ascorbic Acid (VITAMIN C) 1000 MG tablet Take 1,000 mg by mouth      aspirin 81 mg chewable tablet Chew 81 mg daily      atorvastatin (LIPITOR) 20 mg tablet Take 1 tablet (20 mg total) by mouth daily 90 tablet 0    DULoxetine (CYMBALTA) 20 mg capsule Take 1 capsule (20 mg total) by mouth daily 30 capsule 0    meloxicam (Mobic) 15 mg tablet Take 1 tablet (15 mg total) by mouth daily 30 tablet 5    Misc Natural Products (Turmeric Curcumin) CAPS Take 2 tablets by mouth daily      Multiple Vitamins-Minerals (Mens Multi Vitamin & Mineral) TABS Take by mouth daily      omega-3-acid ethyl esters (LOVAZA) 1 g capsule Take 2 capsules (2 g total) by mouth 2 (two) times a day 360 capsule 0    tamsulosin (FLOMAX) 0 4 mg Take 1 capsule (0 4 mg total) by mouth daily with dinner (Patient not taking: Reported on 11/26/2021 ) 15 capsule 0     No current facility-administered medications for this visit  Current Outpatient Medications on File Prior to Visit   Medication Sig    Apple Cider Vinegar 188 MG CAPS Take 2 capsules by mouth daily    Ascorbic Acid (VITAMIN C) 1000 MG tablet Take 1,000 mg by mouth    aspirin 81 mg chewable tablet Chew 81 mg daily    DULoxetine (CYMBALTA) 20 mg capsule Take 1 capsule (20 mg total) by mouth daily    meloxicam (Mobic) 15 mg tablet Take 1 tablet (15 mg total) by mouth daily    Misc Natural Products (Turmeric Curcumin) CAPS Take 2 tablets by mouth daily    Multiple Vitamins-Minerals (Mens Multi Vitamin & Mineral) TABS Take by mouth daily    omega-3-acid ethyl esters (LOVAZA) 1 g capsule Take 2 capsules (2 g total) by mouth 2 (two) times a day    tamsulosin (FLOMAX) 0 4 mg Take 1 capsule (0 4 mg total) by mouth daily with dinner (Patient not taking: Reported on 11/26/2021 )     No current facility-administered medications on file prior to visit  He has No Known Allergies       Review of Systems   Constitutional: Negative for chills, fatigue and fever  HENT: Negative for congestion, ear pain, hearing loss, nosebleeds, postnasal drip, rhinorrhea, sinus pressure, sinus pain, sneezing and sore throat  Eyes: Negative for pain, discharge, itching and visual disturbance  Respiratory: Negative for cough, chest tightness, shortness of breath and wheezing  Cardiovascular: Negative for chest pain, palpitations and leg swelling  Gastrointestinal: Negative for abdominal pain, blood in stool, constipation, diarrhea, nausea and vomiting  Genitourinary: Negative for frequency and urgency  Neurological: Negative for dizziness, light-headedness and numbness  Objective:      /86 (BP Location: Left arm, Patient Position: Sitting, Cuff Size: Large)   Pulse 64   Temp 97 7 °F (36 5 °C)   Ht 6' (1 829 m)   Wt 85 9 kg (189 lb 6 4 oz)   SpO2 97%   BMI 25 69 kg/m²          Physical Exam  Vitals and nursing note reviewed  Constitutional:       General: He is not in acute distress  Appearance: Normal appearance  HENT:      Head: Normocephalic and atraumatic  Nose: Nose normal    Eyes:      Pupils: Pupils are equal, round, and reactive to light  Cardiovascular:      Rate and Rhythm: Normal rate and regular rhythm  Heart sounds: Normal heart sounds  No murmur heard  Pulmonary:      Effort: Pulmonary effort is normal  No respiratory distress  Breath sounds: Normal breath sounds  No wheezing, rhonchi or rales  Musculoskeletal:         General: Normal range of motion  Cervical back: Normal range of motion and neck supple  Right lower leg: No edema  Left lower leg: No edema  Skin:     General: Skin is warm and dry  Neurological:      Mental Status: He is alert and oriented to person, place, and time     Psychiatric:         Mood and Affect: Mood and affect normal

## 2022-04-25 ENCOUNTER — TELEPHONE (OUTPATIENT)
Dept: FAMILY MEDICINE CLINIC | Facility: CLINIC | Age: 57
End: 2022-04-25

## 2022-04-25 DIAGNOSIS — M25.50 POLYARTHRALGIA: ICD-10-CM

## 2022-04-25 DIAGNOSIS — M25.561 CHRONIC PAIN OF BOTH KNEES: Primary | ICD-10-CM

## 2022-04-25 DIAGNOSIS — G89.29 CHRONIC PAIN OF BOTH KNEES: Primary | ICD-10-CM

## 2022-04-25 DIAGNOSIS — M79.10 MYALGIA: ICD-10-CM

## 2022-04-25 DIAGNOSIS — M25.562 CHRONIC PAIN OF BOTH KNEES: Primary | ICD-10-CM

## 2022-04-25 DIAGNOSIS — G89.29 OTHER CHRONIC PAIN: ICD-10-CM

## 2022-04-25 RX ORDER — MELOXICAM 15 MG/1
15 TABLET ORAL DAILY
Qty: 30 TABLET | Refills: 5 | Status: SHIPPED | OUTPATIENT
Start: 2022-04-25

## 2022-04-27 DIAGNOSIS — G89.4 CHRONIC PAIN SYNDROME: ICD-10-CM

## 2022-04-27 RX ORDER — DULOXETIN HYDROCHLORIDE 20 MG/1
20 CAPSULE, DELAYED RELEASE ORAL DAILY
Qty: 30 CAPSULE | Refills: 5 | Status: SHIPPED | OUTPATIENT
Start: 2022-04-27 | End: 2022-07-08

## 2022-06-30 ENCOUNTER — TELEPHONE (OUTPATIENT)
Dept: FAMILY MEDICINE CLINIC | Facility: CLINIC | Age: 57
End: 2022-06-30

## 2022-06-30 DIAGNOSIS — E78.2 MIXED HYPERLIPIDEMIA: ICD-10-CM

## 2022-06-30 RX ORDER — ATORVASTATIN CALCIUM 20 MG/1
20 TABLET, FILM COATED ORAL DAILY
Qty: 7 TABLET | Refills: 0 | Status: SHIPPED | OUTPATIENT
Start: 2022-06-30 | End: 2022-07-08 | Stop reason: SDUPTHER

## 2022-06-30 RX ORDER — OMEGA-3-ACID ETHYL ESTERS 1 G/1
2 CAPSULE, LIQUID FILLED ORAL 2 TIMES DAILY
Qty: 28 CAPSULE | Refills: 0 | Status: SHIPPED | OUTPATIENT
Start: 2022-06-30 | End: 2022-07-08 | Stop reason: SDUPTHER

## 2022-06-30 NOTE — TELEPHONE ENCOUNTER
Pt needs a 1 week supply of Atorvastatin 20mg and Omega 3 to hold him over until his appointment next week  Please send to [facility]/Betty  Thanks!

## 2022-07-01 ENCOUNTER — APPOINTMENT (OUTPATIENT)
Dept: LAB | Facility: CLINIC | Age: 57
End: 2022-07-01
Payer: COMMERCIAL

## 2022-07-01 DIAGNOSIS — E78.2 MIXED HYPERLIPIDEMIA: ICD-10-CM

## 2022-07-01 LAB
ALBUMIN SERPL BCP-MCNC: 3.7 G/DL (ref 3.5–5)
ALP SERPL-CCNC: 77 U/L (ref 46–116)
ALT SERPL W P-5'-P-CCNC: 33 U/L (ref 12–78)
ANION GAP SERPL CALCULATED.3IONS-SCNC: 5 MMOL/L (ref 4–13)
AST SERPL W P-5'-P-CCNC: 23 U/L (ref 5–45)
BILIRUB SERPL-MCNC: 0.4 MG/DL (ref 0.2–1)
BUN SERPL-MCNC: 32 MG/DL (ref 5–25)
CALCIUM SERPL-MCNC: 9.2 MG/DL (ref 8.3–10.1)
CHLORIDE SERPL-SCNC: 110 MMOL/L (ref 100–108)
CHOLEST SERPL-MCNC: 182 MG/DL
CO2 SERPL-SCNC: 27 MMOL/L (ref 21–32)
CREAT SERPL-MCNC: 0.94 MG/DL (ref 0.6–1.3)
GFR SERPL CREATININE-BSD FRML MDRD: 90 ML/MIN/1.73SQ M
GLUCOSE P FAST SERPL-MCNC: 88 MG/DL (ref 65–99)
HDLC SERPL-MCNC: 55 MG/DL
LDLC SERPL CALC-MCNC: 112 MG/DL (ref 0–100)
POTASSIUM SERPL-SCNC: 4.5 MMOL/L (ref 3.5–5.3)
PROT SERPL-MCNC: 7.1 G/DL (ref 6.4–8.2)
SODIUM SERPL-SCNC: 142 MMOL/L (ref 136–145)
TRIGL SERPL-MCNC: 76 MG/DL

## 2022-07-01 PROCEDURE — 36415 COLL VENOUS BLD VENIPUNCTURE: CPT

## 2022-07-01 PROCEDURE — 80061 LIPID PANEL: CPT

## 2022-07-01 PROCEDURE — 80053 COMPREHEN METABOLIC PANEL: CPT

## 2022-07-08 ENCOUNTER — OFFICE VISIT (OUTPATIENT)
Dept: FAMILY MEDICINE CLINIC | Facility: CLINIC | Age: 57
End: 2022-07-08
Payer: COMMERCIAL

## 2022-07-08 VITALS
SYSTOLIC BLOOD PRESSURE: 128 MMHG | HEART RATE: 64 BPM | DIASTOLIC BLOOD PRESSURE: 78 MMHG | WEIGHT: 180 LBS | OXYGEN SATURATION: 97 % | HEIGHT: 72 IN | BODY MASS INDEX: 24.38 KG/M2

## 2022-07-08 DIAGNOSIS — M25.511 CHRONIC PAIN OF BOTH SHOULDERS: ICD-10-CM

## 2022-07-08 DIAGNOSIS — M17.0 ARTHRITIS OF BOTH KNEES: ICD-10-CM

## 2022-07-08 DIAGNOSIS — G89.29 CHRONIC BILATERAL LOW BACK PAIN WITHOUT SCIATICA: ICD-10-CM

## 2022-07-08 DIAGNOSIS — E78.2 MIXED HYPERLIPIDEMIA: Primary | ICD-10-CM

## 2022-07-08 DIAGNOSIS — G89.29 CHRONIC PAIN OF BOTH SHOULDERS: ICD-10-CM

## 2022-07-08 DIAGNOSIS — M25.50 POLYARTHRALGIA: ICD-10-CM

## 2022-07-08 DIAGNOSIS — M25.512 CHRONIC PAIN OF BOTH SHOULDERS: ICD-10-CM

## 2022-07-08 DIAGNOSIS — G89.4 CHRONIC PAIN SYNDROME: ICD-10-CM

## 2022-07-08 DIAGNOSIS — M54.50 CHRONIC BILATERAL LOW BACK PAIN WITHOUT SCIATICA: ICD-10-CM

## 2022-07-08 PROCEDURE — 99214 OFFICE O/P EST MOD 30 MIN: CPT | Performed by: PHYSICIAN ASSISTANT

## 2022-07-08 RX ORDER — OMEGA-3-ACID ETHYL ESTERS 1 G/1
2 CAPSULE, LIQUID FILLED ORAL 2 TIMES DAILY
Qty: 360 CAPSULE | Refills: 0 | Status: SHIPPED | OUTPATIENT
Start: 2022-07-08 | End: 2022-10-06

## 2022-07-08 RX ORDER — ATORVASTATIN CALCIUM 20 MG/1
20 TABLET, FILM COATED ORAL DAILY
Qty: 90 TABLET | Refills: 0 | Status: SHIPPED | OUTPATIENT
Start: 2022-07-08 | End: 2022-10-11 | Stop reason: SDUPTHER

## 2022-07-08 NOTE — PROGRESS NOTES
Assessment/Plan:       Problem List Items Addressed This Visit        Musculoskeletal and Integument    Arthritis of both knees       Other    Chronic bilateral low back pain without sciatica    Chronic pain of both shoulders    Polyarthralgia    Chronic pain syndrome    Mixed hyperlipidemia - Primary    Relevant Medications    atorvastatin (LIPITOR) 20 mg tablet    omega-3-acid ethyl esters (LOVAZA) 1 g capsule    Other Relevant Orders    Lipid Panel with Direct LDL reflex        great response to addition of statin without side effects, normal CMP as well  Will continue, recheck in 3 months, goal LDL <90  Pain improved, continue cymbalta and mobic  Subjective:      Patient ID: León Kwok is a 65 y o  male  Pt presents for follow up and lab review  Labs as below    HLD: lipids much improved on lipitor 20mg, no side effects  Chronic pain, arthritis: improved, doing well on daily cymbalta and mobic       Recent Results (from the past 672 hour(s))  -Comprehensive metabolic panel:   Collection Time: 07/01/22  7:04 AM       Result                      Value             Ref Range           Sodium                      142               136 - 145 mm*       Potassium                   4 5               3 5 - 5 3 mm*       Chloride                    110 (H)           100 - 108 mm*       CO2                         27                21 - 32 mmol*       ANION GAP                   5                 4 - 13 mmol/L       BUN                         32 (H)            5 - 25 mg/dL        Creatinine                  0 94              0 60 - 1 30 *       Glucose, Fasting            88                65 - 99 mg/dL       Calcium                     9 2               8 3 - 10 1 m*       AST                         23                5 - 45 U/L          ALT                         33                12 - 78 U/L         Alkaline Phosphatase        77                46 - 116 U/L        Total Protein               7 1 6 4 - 8 2 g/*       Albumin                     3 7               3 5 - 5 0 g/*       Total Bilirubin             0 40              0 20 - 1 00 *       eGFR                        90                ml/min/1 73s*  -Lipid Panel with Direct LDL reflex:   Collection Time: 07/01/22  7:04 AM       Result                      Value             Ref Range           Cholesterol                 182               See Comment *       Triglycerides               76                See Comment *       HDL, Direct                 55                >=40 mg/dL          LDL Calculated              112 (H)           0 - 100 mg/dL        The following portions of the patient's history were reviewed and updated as appropriate:   He  has a past medical history of Chronic kidney disease and Kidney stone  He   Patient Active Problem List    Diagnosis Date Noted    Mixed hyperlipidemia 12/29/2021    Chronic bilateral low back pain without sciatica 11/26/2021    Chronic pain of both shoulders 11/26/2021    Polyarthralgia 11/26/2021    Arthritis of both knees 11/26/2021    Chronic pain syndrome 11/26/2021    Nephrolithiasis 03/20/2018     He  has a past surgical history that includes Knee arthroscopy w/ ACL reconstruction; Finger amputation (Left); Nasal septum surgery (Bilateral); Knee arthroscopy (Left); Fracture surgery (Left); and pr cysto/uretero w/lithotripsy &indwell stent insrt (Left, 4/5/2018)  His family history includes Diabetes in his father and mother  He  reports that he has never smoked  He has never used smokeless tobacco  He reports that he does not drink alcohol and does not use drugs    Current Outpatient Medications   Medication Sig Dispense Refill    Apple Cider Vinegar 188 MG CAPS Take 2 capsules by mouth daily      Ascorbic Acid (VITAMIN C) 1000 MG tablet Take 1,000 mg by mouth      aspirin 81 mg chewable tablet Chew 81 mg daily      atorvastatin (LIPITOR) 20 mg tablet Take 1 tablet (20 mg total) by mouth daily 90 tablet 0    DULoxetine (CYMBALTA) 20 mg capsule Take 1 capsule (20 mg total) by mouth daily 30 capsule 5    meloxicam (Mobic) 15 mg tablet Take 1 tablet (15 mg total) by mouth daily 30 tablet 5    Misc Natural Products (Turmeric Curcumin) CAPS Take 2 tablets by mouth daily      Multiple Vitamins-Minerals (Mens Multi Vitamin & Mineral) TABS Take by mouth daily      omega-3-acid ethyl esters (LOVAZA) 1 g capsule Take 2 capsules (2 g total) by mouth 2 (two) times a day 360 capsule 0    tamsulosin (FLOMAX) 0 4 mg Take 1 capsule (0 4 mg total) by mouth daily with dinner 15 capsule 0     No current facility-administered medications for this visit  Current Outpatient Medications on File Prior to Visit   Medication Sig    Apple Cider Vinegar 188 MG CAPS Take 2 capsules by mouth daily    Ascorbic Acid (VITAMIN C) 1000 MG tablet Take 1,000 mg by mouth    aspirin 81 mg chewable tablet Chew 81 mg daily    DULoxetine (CYMBALTA) 20 mg capsule Take 1 capsule (20 mg total) by mouth daily    meloxicam (Mobic) 15 mg tablet Take 1 tablet (15 mg total) by mouth daily    Misc Natural Products (Turmeric Curcumin) CAPS Take 2 tablets by mouth daily    Multiple Vitamins-Minerals (Mens Multi Vitamin & Mineral) TABS Take by mouth daily    tamsulosin (FLOMAX) 0 4 mg Take 1 capsule (0 4 mg total) by mouth daily with dinner    [DISCONTINUED] atorvastatin (LIPITOR) 20 mg tablet Take 1 tablet (20 mg total) by mouth daily for 7 days    [DISCONTINUED] omega-3-acid ethyl esters (LOVAZA) 1 g capsule Take 2 capsules (2 g total) by mouth 2 (two) times a day     No current facility-administered medications on file prior to visit  He has No Known Allergies       Review of Systems   Constitutional: Negative for chills, fatigue and fever  HENT: Negative for congestion, ear pain, hearing loss, nosebleeds, postnasal drip, rhinorrhea, sinus pressure, sinus pain, sneezing and sore throat      Eyes: Negative for pain, discharge, itching and visual disturbance  Respiratory: Negative for cough, chest tightness, shortness of breath and wheezing  Cardiovascular: Negative for chest pain, palpitations and leg swelling  Gastrointestinal: Negative for abdominal pain, blood in stool, constipation, diarrhea, nausea and vomiting  Genitourinary: Negative for frequency and urgency  Musculoskeletal: Positive for arthralgias and back pain  Neurological: Negative for dizziness, light-headedness and numbness  Objective:      /78   Pulse 64   Ht 6' (1 829 m)   Wt 81 6 kg (180 lb)   SpO2 97%   BMI 24 41 kg/m²          Physical Exam  Vitals and nursing note reviewed  Constitutional:       General: He is not in acute distress  Appearance: Normal appearance  HENT:      Head: Normocephalic and atraumatic  Right Ear: Tympanic membrane, ear canal and external ear normal       Left Ear: Tympanic membrane, ear canal and external ear normal       Nose: Congestion present  Mouth/Throat:      Comments: pnd noted  Eyes:      Pupils: Pupils are equal, round, and reactive to light  Cardiovascular:      Rate and Rhythm: Normal rate and regular rhythm  Heart sounds: Normal heart sounds  No murmur heard  Pulmonary:      Effort: Pulmonary effort is normal  No respiratory distress  Breath sounds: Normal breath sounds  No wheezing, rhonchi or rales  Musculoskeletal:         General: Normal range of motion  Cervical back: Normal range of motion and neck supple  Lymphadenopathy:      Cervical: No cervical adenopathy  Skin:     General: Skin is warm and dry  Neurological:      Mental Status: He is alert and oriented to person, place, and time     Psychiatric:         Mood and Affect: Mood and affect normal

## 2022-10-11 ENCOUNTER — APPOINTMENT (OUTPATIENT)
Dept: LAB | Facility: CLINIC | Age: 57
End: 2022-10-11
Payer: COMMERCIAL

## 2022-10-11 DIAGNOSIS — E78.2 MIXED HYPERLIPIDEMIA: ICD-10-CM

## 2022-10-11 LAB
CHOLEST SERPL-MCNC: 178 MG/DL
HDLC SERPL-MCNC: 51 MG/DL
LDLC SERPL CALC-MCNC: 109 MG/DL (ref 0–100)
TRIGL SERPL-MCNC: 91 MG/DL

## 2022-10-11 PROCEDURE — 80061 LIPID PANEL: CPT

## 2022-10-11 PROCEDURE — 36415 COLL VENOUS BLD VENIPUNCTURE: CPT

## 2022-10-11 RX ORDER — ATORVASTATIN CALCIUM 20 MG/1
20 TABLET, FILM COATED ORAL DAILY
Qty: 90 TABLET | Refills: 0 | Status: SHIPPED | OUTPATIENT
Start: 2022-10-11 | End: 2023-01-09

## 2022-10-11 NOTE — TELEPHONE ENCOUNTER
Pt stopped in to advise he is doing labs today  He ran out of lipitor 20 mg  Pt requests refill and call with labs results upon receipt

## 2022-11-01 DIAGNOSIS — G89.4 CHRONIC PAIN SYNDROME: ICD-10-CM

## 2022-11-01 RX ORDER — DULOXETIN HYDROCHLORIDE 20 MG/1
20 CAPSULE, DELAYED RELEASE ORAL DAILY
Qty: 30 CAPSULE | Refills: 5 | Status: SHIPPED | OUTPATIENT
Start: 2022-11-01 | End: 2022-12-01

## 2022-11-22 DIAGNOSIS — E78.2 MIXED HYPERLIPIDEMIA: ICD-10-CM

## 2022-11-22 RX ORDER — OMEGA-3-ACID ETHYL ESTERS 1 G/1
2 CAPSULE, LIQUID FILLED ORAL 2 TIMES DAILY
Qty: 360 CAPSULE | Refills: 0 | Status: SHIPPED | OUTPATIENT
Start: 2022-11-22 | End: 2023-02-20

## 2023-01-11 ENCOUNTER — TELEPHONE (OUTPATIENT)
Dept: FAMILY MEDICINE CLINIC | Facility: CLINIC | Age: 58
End: 2023-01-11

## 2023-01-11 DIAGNOSIS — E78.2 MIXED HYPERLIPIDEMIA: ICD-10-CM

## 2023-01-11 RX ORDER — ATORVASTATIN CALCIUM 20 MG/1
20 TABLET, FILM COATED ORAL DAILY
Qty: 90 TABLET | Refills: 0 | Status: SHIPPED | OUTPATIENT
Start: 2023-01-11 | End: 2023-01-18

## 2023-01-12 DIAGNOSIS — Z13.1 SCREENING FOR DIABETES MELLITUS: ICD-10-CM

## 2023-01-12 DIAGNOSIS — E78.2 MIXED HYPERLIPIDEMIA: Primary | ICD-10-CM

## 2023-01-12 DIAGNOSIS — Z12.5 SCREENING FOR PROSTATE CANCER: ICD-10-CM

## 2023-01-17 ENCOUNTER — APPOINTMENT (OUTPATIENT)
Dept: LAB | Facility: CLINIC | Age: 58
End: 2023-01-17

## 2023-01-17 DIAGNOSIS — Z12.5 SCREENING FOR PROSTATE CANCER: ICD-10-CM

## 2023-01-17 DIAGNOSIS — E78.2 MIXED HYPERLIPIDEMIA: ICD-10-CM

## 2023-01-17 DIAGNOSIS — Z13.1 SCREENING FOR DIABETES MELLITUS: ICD-10-CM

## 2023-01-17 LAB
ALBUMIN SERPL BCP-MCNC: 3.9 G/DL (ref 3.5–5)
ALP SERPL-CCNC: 84 U/L (ref 46–116)
ALT SERPL W P-5'-P-CCNC: 31 U/L (ref 12–78)
ANION GAP SERPL CALCULATED.3IONS-SCNC: 3 MMOL/L (ref 4–13)
AST SERPL W P-5'-P-CCNC: 22 U/L (ref 5–45)
BILIRUB SERPL-MCNC: 0.7 MG/DL (ref 0.2–1)
BUN SERPL-MCNC: 19 MG/DL (ref 5–25)
CALCIUM SERPL-MCNC: 9.5 MG/DL (ref 8.3–10.1)
CHLORIDE SERPL-SCNC: 110 MMOL/L (ref 96–108)
CHOLEST SERPL-MCNC: 178 MG/DL
CO2 SERPL-SCNC: 29 MMOL/L (ref 21–32)
CREAT SERPL-MCNC: 0.95 MG/DL (ref 0.6–1.3)
GFR SERPL CREATININE-BSD FRML MDRD: 89 ML/MIN/1.73SQ M
GLUCOSE P FAST SERPL-MCNC: 98 MG/DL (ref 65–99)
HDLC SERPL-MCNC: 56 MG/DL
LDLC SERPL CALC-MCNC: 103 MG/DL (ref 0–100)
POTASSIUM SERPL-SCNC: 4.3 MMOL/L (ref 3.5–5.3)
PROT SERPL-MCNC: 7.4 G/DL (ref 6.4–8.4)
PSA SERPL-MCNC: 0.5 NG/ML (ref 0–4)
SODIUM SERPL-SCNC: 142 MMOL/L (ref 135–147)
TRIGL SERPL-MCNC: 94 MG/DL

## 2023-01-18 ENCOUNTER — OFFICE VISIT (OUTPATIENT)
Dept: FAMILY MEDICINE CLINIC | Facility: CLINIC | Age: 58
End: 2023-01-18

## 2023-01-18 VITALS
SYSTOLIC BLOOD PRESSURE: 132 MMHG | BODY MASS INDEX: 25.47 KG/M2 | HEIGHT: 72 IN | TEMPERATURE: 96.3 F | WEIGHT: 188 LBS | OXYGEN SATURATION: 100 % | HEART RATE: 61 BPM | DIASTOLIC BLOOD PRESSURE: 88 MMHG

## 2023-01-18 DIAGNOSIS — E78.2 MIXED HYPERLIPIDEMIA: ICD-10-CM

## 2023-01-18 DIAGNOSIS — Z00.00 HEALTH MAINTENANCE EXAMINATION: Primary | ICD-10-CM

## 2023-01-18 DIAGNOSIS — G89.4 CHRONIC PAIN SYNDROME: ICD-10-CM

## 2023-01-18 DIAGNOSIS — M25.512 CHRONIC PAIN OF BOTH SHOULDERS: ICD-10-CM

## 2023-01-18 DIAGNOSIS — M54.50 CHRONIC BILATERAL LOW BACK PAIN WITHOUT SCIATICA: ICD-10-CM

## 2023-01-18 DIAGNOSIS — G89.29 CHRONIC PAIN OF BOTH SHOULDERS: ICD-10-CM

## 2023-01-18 DIAGNOSIS — M25.511 CHRONIC PAIN OF BOTH SHOULDERS: ICD-10-CM

## 2023-01-18 DIAGNOSIS — G89.29 CHRONIC BILATERAL LOW BACK PAIN WITHOUT SCIATICA: ICD-10-CM

## 2023-01-18 RX ORDER — ATORVASTATIN CALCIUM 20 MG/1
10 TABLET, FILM COATED ORAL DAILY
Qty: 45 TABLET | Refills: 0
Start: 2023-01-18 | End: 2023-04-18

## 2023-01-18 NOTE — PROGRESS NOTES
Name: Domi Durand      : 1965      MRN: [id number]  Encounter Provider: Cande Drake PA-C  Encounter Date: 2023   Encounter department: [address] [place] [address]     1  Health maintenance examination    2  Mixed hyperlipidemia  -     Comprehensive metabolic panel; Future  -     Lipid Panel with Direct LDL reflex; Future  -     atorvastatin (LIPITOR) 20 mg tablet; Take 0 5 tablets (10 mg total) by mouth daily    3  Chronic pain of both shoulders    4  Chronic bilateral low back pain without sciatica    5  Chronic pain syndrome  hx reviewed and updated, lipids much better controlled and ASCVD risk down to 5 2  decrease lipitor to 10mg and discussed diet/exercise for further lipid control  Repeat labs in 6 months  Continue cymbalta, prn mobic for pain  Unremarkable exam today  HM UTD  6 month follow up, earlier prn       Subjective     Pt presents for routine follow up, physical  Labs reviewed as below  No interval health changes  His pain is well controlled on daily cymbalta with prn mobic  Lipids are much improved on daily statin  UTD with CRC screening, prostate cancer screening  Non smoker  Stays physically active  Environmental allergies, no other allergies  Medications reviewed and updated       Recent Results (from the past 672 hour(s))  -Comprehensive metabolic panel:   Collection Time: 23  8:33 AM       Result                      Value             Ref Range           Sodium                      142               135 - 147 mm*       Potassium                   4 3               3 5 - 5 3 mm*       Chloride                    110 (H)           96 - 108 mmo*       CO2                         29                21 - 32 mmol*       ANION GAP                   3 (L)             4 - 13 mmol/L       BUN                         19                5 - 25 mg/dL        Creatinine                  0 95              0 60 - 1 30 *       Glucose, Fasting            98 65 - 99 mg/dL       Calcium                     9 5               8 3 - 10 1 m*       AST                         22                5 - 45 U/L          ALT                         31                12 - 78 U/L         Alkaline Phosphatase        84                46 - 116 U/L        Total Protein               7 4               6 4 - 8 4 g/*       Albumin                     3 9               3 5 - 5 0 g/*       Total Bilirubin             0 70              0 20 - 1 00 *       eGFR                        89                ml/min/1 73s*  -Lipid Panel with Direct LDL reflex:   Collection Time: 01/17/23  8:33 AM       Result                      Value             Ref Range           Cholesterol                 178               See Comment *       Triglycerides               94                See Comment *       HDL, Direct                 56                >=40 mg/dL          LDL Calculated              103 (H)           0 - 100 mg/dL  -PSA, Total Screen:   Collection Time: 01/17/23  8:33 AM       Result                      Value             Ref Range           PSA                         0 5               0 0 - 4 0 ng*      Review of Systems   Constitutional: Negative for chills, fatigue and fever  HENT: Negative for congestion, ear pain, hearing loss, nosebleeds, postnasal drip, rhinorrhea, sinus pressure, sinus pain, sneezing and sore throat  Eyes: Negative for pain, discharge, itching and visual disturbance  Respiratory: Negative for cough, chest tightness, shortness of breath and wheezing  Cardiovascular: Negative for chest pain, palpitations and leg swelling  Gastrointestinal: Negative for abdominal pain, blood in stool, constipation, diarrhea, nausea and vomiting  Genitourinary: Negative for frequency and urgency  Musculoskeletal: Positive for arthralgias (improved, stable)  Neurological: Negative for dizziness, light-headedness and numbness         Past Medical History: Diagnosis Date   • Chronic kidney disease     ruptured left kidney   • Kidney stone      Past Surgical History:   Procedure Laterality Date   • FINGER AMPUTATION Left     4th   • FRACTURE SURGERY Left     femur   • KNEE ARTHROSCOPY Left    • KNEE ARTHROSCOPY W/ ACL RECONSTRUCTION     • NASAL SEPTUM SURGERY Bilateral    • RI CYSTO/URETERO W/LITHOTRIPSY &INDWELL STENT INSRT Left 4/5/2018    Procedure: CYSTOSCOPY URETEROSCOPY WITH LITHOTRIPSY HOLMIUM LASER, RETROGRADE PYELOGRAM AND INSERTION STENT URETERAL;  Surgeon: Melonie Miller MD;  Location: [facility];  Service: Urology     Family History   Problem Relation Age of Onset   • Diabetes Father    • Diabetes Mother      Social History     Socioeconomic History   • Marital status: /Civil Union     Spouse name: None   • Number of children: None   • Years of education: None   • Highest education level: None   Occupational History   • None   Tobacco Use   • Smoking status: Never   • Smokeless tobacco: Never   Vaping Use   • Vaping Use: Never used   Substance and Sexual Activity   • Alcohol use: No   • Drug use: No   • Sexual activity: None   Other Topics Concern   • None   Social History Narrative   • None     Social Determinants of Health     Financial Resource Strain: Not on file   Food Insecurity: Not on file   Transportation Needs: Not on file   Physical Activity: Not on file   Stress: Not on file   Social Connections: Not on file   Intimate Partner Violence: Not on file   Housing Stability: Not on file     Current Outpatient Medications on File Prior to Visit   Medication Sig   • Apple Cider Vinegar 188 MG CAPS Take 2 capsules by mouth daily   • Ascorbic Acid (VITAMIN C) 1000 MG tablet Take 1,000 mg by mouth   • aspirin 81 mg chewable tablet Chew 81 mg daily   • DULoxetine (CYMBALTA) 20 mg capsule Take 1 capsule (20 mg total) by mouth daily   • meloxicam (Mobic) 15 mg tablet Take 1 tablet (15 mg total) by mouth daily   • Misc Natural Products (Turmeric Curcumin) CAPS Take 2 tablets by mouth daily   • Multiple Vitamins-Minerals (Mens Multi Vitamin & Mineral) TABS Take by mouth daily   • omega-3-acid ethyl esters (LOVAZA) 1 g capsule Take 2 capsules (2 g total) by mouth 2 (two) times a day   • [DISCONTINUED] atorvastatin (LIPITOR) 20 mg tablet Take 1 tablet (20 mg total) by mouth daily   • [DISCONTINUED] tamsulosin (FLOMAX) 0 4 mg Take 1 capsule (0 4 mg total) by mouth daily with dinner     No Known Allergies  Immunization History   Administered Date(s) Administered   • TD (adult) Preservative Free 10/19/2019       Objective     /88 (BP Location: Right arm, Patient Position: Sitting, Cuff Size: Adult)   Pulse 61   Temp (!) 96 3 °F (35 7 °C)   Ht 6' (1 829 m)   Wt 85 3 kg (188 lb)   SpO2 100%   BMI 25 50 kg/m²     Physical Exam  Vitals and nursing note reviewed  Constitutional:       General: He is not in acute distress  Appearance: Normal appearance  HENT:      Head: Normocephalic and atraumatic  Nose: Nose normal       Mouth/Throat:      Mouth: Mucous membranes are moist       Pharynx: Oropharynx is clear  No oropharyngeal exudate or posterior oropharyngeal erythema  Eyes:      Pupils: Pupils are equal, round, and reactive to light  Cardiovascular:      Rate and Rhythm: Normal rate and regular rhythm  Heart sounds: Normal heart sounds  No murmur heard  Pulmonary:      Effort: Pulmonary effort is normal  No respiratory distress  Breath sounds: Normal breath sounds  No wheezing, rhonchi or rales  Abdominal:      General: Bowel sounds are normal  There is no distension  Palpations: Abdomen is soft  Tenderness: There is no abdominal tenderness  There is no guarding  Musculoskeletal:         General: Normal range of motion  Cervical back: Normal range of motion and neck supple  Right lower leg: No edema  Left lower leg: No edema  Skin:     General: Skin is warm and dry     Neurological:      Mental Status: He is alert and oriented to person, place, and time     Psychiatric:         Mood and Affect: Mood and affect normal        Aleida Almanzar PA-C

## 2023-05-30 DIAGNOSIS — E78.2 MIXED HYPERLIPIDEMIA: ICD-10-CM

## 2023-05-30 DIAGNOSIS — G89.4 CHRONIC PAIN SYNDROME: ICD-10-CM

## 2023-05-30 RX ORDER — DULOXETIN HYDROCHLORIDE 20 MG/1
20 CAPSULE, DELAYED RELEASE ORAL DAILY
Qty: 90 CAPSULE | Refills: 1 | Status: SHIPPED | OUTPATIENT
Start: 2023-05-30

## 2023-05-30 RX ORDER — OMEGA-3-ACID ETHYL ESTERS 1 G/1
2 CAPSULE, LIQUID FILLED ORAL 2 TIMES DAILY
Qty: 360 CAPSULE | Refills: 0 | Status: SHIPPED | OUTPATIENT
Start: 2023-05-30 | End: 2023-08-28

## 2023-05-30 NOTE — TELEPHONE ENCOUNTER
Spoke with the pt, he is requesting a refill of the Lovaza  The pt was not sure if you wanted him to continue taking the Cymbalta  If so, he is requesting a refill of that as well

## 2023-06-19 ENCOUNTER — APPOINTMENT (OUTPATIENT)
Dept: LAB | Facility: CLINIC | Age: 58
End: 2023-06-19
Payer: COMMERCIAL

## 2023-06-19 ENCOUNTER — TELEPHONE (OUTPATIENT)
Dept: FAMILY MEDICINE CLINIC | Facility: CLINIC | Age: 58
End: 2023-06-19

## 2023-06-19 DIAGNOSIS — E78.2 MIXED HYPERLIPIDEMIA: ICD-10-CM

## 2023-06-19 DIAGNOSIS — R39.9 LOWER URINARY TRACT SYMPTOMS (LUTS): Primary | ICD-10-CM

## 2023-06-19 LAB
ALBUMIN SERPL BCP-MCNC: 4 G/DL (ref 3.5–5)
ALP SERPL-CCNC: 77 U/L (ref 46–116)
ALT SERPL W P-5'-P-CCNC: 25 U/L (ref 12–78)
ANION GAP SERPL CALCULATED.3IONS-SCNC: 2 MMOL/L (ref 4–13)
AST SERPL W P-5'-P-CCNC: 14 U/L (ref 5–45)
BILIRUB SERPL-MCNC: 0.54 MG/DL (ref 0.2–1)
BUN SERPL-MCNC: 20 MG/DL (ref 5–25)
CALCIUM SERPL-MCNC: 9.4 MG/DL (ref 8.3–10.1)
CHLORIDE SERPL-SCNC: 112 MMOL/L (ref 96–108)
CHOLEST SERPL-MCNC: 200 MG/DL
CO2 SERPL-SCNC: 28 MMOL/L (ref 21–32)
CREAT SERPL-MCNC: 0.93 MG/DL (ref 0.6–1.3)
GFR SERPL CREATININE-BSD FRML MDRD: 91 ML/MIN/1.73SQ M
GLUCOSE P FAST SERPL-MCNC: 87 MG/DL (ref 65–99)
HDLC SERPL-MCNC: 49 MG/DL
LDLC SERPL CALC-MCNC: 125 MG/DL (ref 0–100)
POTASSIUM SERPL-SCNC: 4.3 MMOL/L (ref 3.5–5.3)
PROT SERPL-MCNC: 7.4 G/DL (ref 6.4–8.4)
SODIUM SERPL-SCNC: 142 MMOL/L (ref 135–147)
TRIGL SERPL-MCNC: 129 MG/DL

## 2023-06-19 PROCEDURE — 36415 COLL VENOUS BLD VENIPUNCTURE: CPT

## 2023-06-19 PROCEDURE — 80061 LIPID PANEL: CPT

## 2023-06-19 PROCEDURE — 80053 COMPREHEN METABOLIC PANEL: CPT

## 2023-06-19 NOTE — TELEPHONE ENCOUNTER
Pt did labs this morning  He is requesting you put a script in for Prostate (PSA)  He has been experiencing frequent bathroom trips, some burning and wants it checked out  He has an appt with you on Wednesday this week   The lab is waiting for script

## 2023-06-19 NOTE — TELEPHONE ENCOUNTER
He had a normal screening PSA in January this year  I ordered it diagnostic   We can check a UA in the office Wednesday

## 2023-06-21 ENCOUNTER — OFFICE VISIT (OUTPATIENT)
Dept: FAMILY MEDICINE CLINIC | Facility: CLINIC | Age: 58
End: 2023-06-21
Payer: COMMERCIAL

## 2023-06-21 ENCOUNTER — APPOINTMENT (OUTPATIENT)
Dept: LAB | Facility: CLINIC | Age: 58
End: 2023-06-21
Payer: COMMERCIAL

## 2023-06-21 ENCOUNTER — TELEPHONE (OUTPATIENT)
Dept: UROLOGY | Facility: AMBULATORY SURGERY CENTER | Age: 58
End: 2023-06-21

## 2023-06-21 VITALS
HEIGHT: 72 IN | BODY MASS INDEX: 24.24 KG/M2 | TEMPERATURE: 96.9 F | HEART RATE: 61 BPM | WEIGHT: 179 LBS | SYSTOLIC BLOOD PRESSURE: 118 MMHG | DIASTOLIC BLOOD PRESSURE: 80 MMHG | OXYGEN SATURATION: 100 %

## 2023-06-21 DIAGNOSIS — Z86.79 HISTORY OF REPAIR OF DISSECTING ANEURYSM OF DESCENDING THORACIC AORTA: ICD-10-CM

## 2023-06-21 DIAGNOSIS — R39.9 LOWER URINARY TRACT SYMPTOMS (LUTS): ICD-10-CM

## 2023-06-21 DIAGNOSIS — Z98.890 HISTORY OF REPAIR OF DISSECTING ANEURYSM OF DESCENDING THORACIC AORTA: ICD-10-CM

## 2023-06-21 DIAGNOSIS — G89.4 CHRONIC PAIN SYNDROME: Primary | ICD-10-CM

## 2023-06-21 DIAGNOSIS — R31.29 OTHER MICROSCOPIC HEMATURIA: ICD-10-CM

## 2023-06-21 DIAGNOSIS — E78.2 MIXED HYPERLIPIDEMIA: ICD-10-CM

## 2023-06-21 LAB
PSA SERPL-MCNC: 0.46 NG/ML (ref 0–4)
SL AMB  POCT GLUCOSE, UA: NEGATIVE
SL AMB LEUKOCYTE ESTERASE,UA: NEGATIVE
SL AMB POCT BILIRUBIN,UA: NEGATIVE
SL AMB POCT BLOOD,UA: ABNORMAL
SL AMB POCT KETONES,UA: NEGATIVE
SL AMB POCT NITRITE,UA: NEGATIVE
SL AMB POCT PH,UA: 6
SL AMB POCT SPECIFIC GRAVITY,UA: 1.03
SL AMB POCT URINE PROTEIN: NEGATIVE
SL AMB POCT UROBILINOGEN: ABNORMAL

## 2023-06-21 PROCEDURE — 81002 URINALYSIS NONAUTO W/O SCOPE: CPT | Performed by: PHYSICIAN ASSISTANT

## 2023-06-21 PROCEDURE — 36415 COLL VENOUS BLD VENIPUNCTURE: CPT

## 2023-06-21 PROCEDURE — 99214 OFFICE O/P EST MOD 30 MIN: CPT | Performed by: PHYSICIAN ASSISTANT

## 2023-06-21 PROCEDURE — 84153 ASSAY OF PSA TOTAL: CPT

## 2023-06-21 NOTE — PROGRESS NOTES
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    5  Other microscopic hematuria  -     US kidney and bladder; Future; Expected date: 2023  -     Ambulatory Referral to Urology; Future  -     UA (URINE) with reflex to Scope    continue lipitor 10mg and cut down fats/carbs in diet  Continue to stay active  Repeat lipids in 6 months  UA shows 3+ blood  Recommend US bladder, urology  Repeat UA with scope in 2 weeks, recheck PSA, last was normal  Continue routinely following with vascular, no vascular concerns at last visit, stents patent  Pain controlled, continue cymbalta       Subjective     Pt presents for routine follow up  Labs reviewed    Occasional luts, mostly dribbling, some burning  Not chronic  No weak stream or trouble starting/stopping stream  No nocturia  Follows routinely with vascular for hx of trauma dissection of descending aorta s/p repair  No chest pain, SOB  Chronic pain controlled with cymbalta, meloxicam   HLD lipids increased a bit, last visit we decreased lipitor to 10mg, however pt eating a poor diet, some fast food        Recent Results (from the past 672 hour(s))  -Comprehensive metabolic panel:   Collection Time: 23  7:58 AM       Result                      Value             Ref Range           Sodium                      142               135 - 147 mm*       Potassium                   4 3               3 5 - 5 3 mm*       Chloride                    112 (H)           96 - 108 mmo*       CO2                         28                21 - 32 mmol*       ANION GAP                   2 (L)             4 - 13 mmol/L BUN                         20                5 - 25 mg/dL        Creatinine                  0 93              0 60 - 1 30 *       Glucose, Fasting            87                65 - 99 mg/dL       Calcium                     9 4               8 3 - 10 1 m*       AST                         14                5 - 45 U/L          ALT                         25                12 - 78 U/L         Alkaline Phosphatase        77                46 - 116 U/L        Total Protein               7 4               6 4 - 8 4 g/*       Albumin                     4 0               3 5 - 5 0 g/*       Total Bilirubin             0 54              0 20 - 1 00 *       eGFR                        91                ml/min/1 73s*  -Lipid Panel with Direct LDL reflex:   Collection Time: 06/19/23  7:58 AM       Result                      Value             Ref Range           Cholesterol                 200               See Comment *       Triglycerides               129               See Comment *       HDL, Direct                 49                >=40 mg/dL          LDL Calculated              125 (H)           0 - 100 mg/dL      Review of Systems   Constitutional: Negative for chills, fatigue and fever  HENT: Negative for congestion, ear pain, hearing loss, nosebleeds, postnasal drip, rhinorrhea, sinus pressure, sinus pain, sneezing and sore throat  Eyes: Negative for pain, discharge, itching and visual disturbance  Respiratory: Negative for cough, chest tightness, shortness of breath and wheezing  Cardiovascular: Negative for chest pain, palpitations and leg swelling  Gastrointestinal: Negative for abdominal pain, blood in stool, constipation, diarrhea, nausea and vomiting  Genitourinary: Negative for decreased urine volume, difficulty urinating, dysuria, flank pain, frequency, hematuria and urgency  Musculoskeletal: Positive for arthralgias (chronic) and back pain (chronic)     Neurological: Negative for dizziness, light-headedness and numbness         Past Medical History:   Diagnosis Date   • Chronic kidney disease     ruptured left kidney   • Kidney stone      Past Surgical History:   Procedure Laterality Date   • FINGER AMPUTATION Left     4th   • FRACTURE SURGERY Left     femur   • KNEE ARTHROSCOPY Left    • KNEE ARTHROSCOPY W/ ACL RECONSTRUCTION     • NASAL SEPTUM SURGERY Bilateral    • LA CYSTO/URETERO W/LITHOTRIPSY &INDWELL STENT INSRT Left 4/5/2018    Procedure: CYSTOSCOPY URETEROSCOPY WITH LITHOTRIPSY HOLMIUM LASER, RETROGRADE PYELOGRAM AND INSERTION STENT URETERAL;  Surgeon: Randal Ramey MD;  Location: [facility];  Service: Urology     Family History   Problem Relation Age of Onset   • Diabetes Father    • Diabetes Mother      Social History     Socioeconomic History   • Marital status: /Civil Union     Spouse name: None   • Number of children: None   • Years of education: None   • Highest education level: None   Occupational History   • None   Tobacco Use   • Smoking status: Never   • Smokeless tobacco: Never   Vaping Use   • Vaping Use: Never used   Substance and Sexual Activity   • Alcohol use: No   • Drug use: No   • Sexual activity: None   Other Topics Concern   • None   Social History Narrative   • None     Social Determinants of Health     Financial Resource Strain: Not on file   Food Insecurity: Not on file   Transportation Needs: Not on file   Physical Activity: Not on file   Stress: Not on file   Social Connections: Not on file   Intimate Partner Violence: Not on file   Housing Stability: Not on file     Current Outpatient Medications on File Prior to Visit   Medication Sig   • Apple Cider Vinegar 188 MG CAPS Take 2 capsules by mouth daily   • Ascorbic Acid (VITAMIN C) 1000 MG tablet Take 1,000 mg by mouth   • aspirin 81 mg chewable tablet Chew 81 mg daily   • atorvastatin (LIPITOR) 20 mg tablet Take 0 5 tablets (10 mg total) by mouth daily   • DULoxetine (CYMBALTA) 20 mg capsule Take 1 capsule (20 mg total) by mouth daily   • meloxicam (Mobic) 15 mg tablet Take 1 tablet (15 mg total) by mouth daily   • Misc Natural Products (Turmeric Curcumin) CAPS Take 2 tablets by mouth daily   • Multiple Vitamins-Minerals (Mens Multi Vitamin & Mineral) TABS Take by mouth daily   • omega-3-acid ethyl esters (LOVAZA) 1 g capsule Take 2 capsules (2 g total) by mouth 2 (two) times a day     No Known Allergies  Immunization History   Administered Date(s) Administered   • TD (adult) Preservative Free 10/19/2019       Objective     /80 (BP Location: Left arm, Patient Position: Sitting, Cuff Size: Adult)   Pulse 61   Temp (!) 96 9 °F (36 1 °C)   Ht 6' (1 829 m)   Wt 81 2 kg (179 lb)   SpO2 100%   BMI 24 28 kg/m²     Physical Exam  Vitals and nursing note reviewed  Constitutional:       General: He is not in acute distress  Appearance: Normal appearance  HENT:      Head: Normocephalic and atraumatic  Nose: Nose normal       Mouth/Throat:      Mouth: Mucous membranes are moist       Pharynx: Oropharynx is clear  No oropharyngeal exudate or posterior oropharyngeal erythema  Eyes:      Pupils: Pupils are equal, round, and reactive to light  Cardiovascular:      Rate and Rhythm: Normal rate and regular rhythm  Heart sounds: Normal heart sounds  No murmur heard  Pulmonary:      Effort: Pulmonary effort is normal  No respiratory distress  Breath sounds: Normal breath sounds  No wheezing or rales  Abdominal:      General: Bowel sounds are normal  There is no distension  Palpations: Abdomen is soft  Tenderness: There is no abdominal tenderness  There is no guarding  Musculoskeletal:         General: Normal range of motion  Cervical back: Normal range of motion and neck supple  Right lower leg: No edema  Left lower leg: No edema  Skin:     General: Skin is warm and dry  Neurological:      Mental Status: He is alert and oriented to person, place, and time  Psychiatric:         Mood and Affect: Mood and affect normal        Leandra NORA cA

## 2023-06-21 NOTE — TELEPHONE ENCOUNTER
New Patient    What is the reason for the patient’s appointment?: Other microscopic hematuria    What office location does the patient prefer?: [facility]     Have patient records been requested?:  If No, are the records showing in Epic: records in epic       INSURANCE:   Do we accept the patient's insurance or is the patient Self-Pay?: yes    Insurance Provider: [address] [place] [address]:   Member ID#: [id number]      HISTORY:   Has the patient had any previous Urologist(s)?: Yes [facility] in 2018    Was the patient seen in the ED?: no     Has the patient had any outside testing done?: n/a    Does the patient have a personal history of cancer?: no       Patient is having US done on 6/28/23  Patient will need to be seen after this is completed  Patient does not want to wait till August to be seen  Requesting sooner apt         CB: [phone]

## 2023-06-28 ENCOUNTER — HOSPITAL ENCOUNTER (OUTPATIENT)
Dept: ULTRASOUND IMAGING | Facility: HOSPITAL | Age: 58
Discharge: HOME | End: 2023-06-28
Payer: COMMERCIAL

## 2023-06-28 DIAGNOSIS — R31.29 OTHER MICROSCOPIC HEMATURIA: ICD-10-CM

## 2023-06-28 PROCEDURE — 76775 US EXAM ABDO BACK WALL LIM: CPT

## 2023-07-17 DIAGNOSIS — E78.2 MIXED HYPERLIPIDEMIA: ICD-10-CM

## 2023-07-17 RX ORDER — ATORVASTATIN CALCIUM 10 MG/1
10 TABLET, FILM COATED ORAL DAILY
Qty: 90 TABLET | Refills: 0 | Status: SHIPPED | OUTPATIENT
Start: 2023-07-17 | End: 2023-10-15

## 2023-07-31 DIAGNOSIS — E78.2 MIXED HYPERLIPIDEMIA: ICD-10-CM

## 2023-07-31 DIAGNOSIS — G89.4 CHRONIC PAIN SYNDROME: ICD-10-CM

## 2023-07-31 RX ORDER — DULOXETIN HYDROCHLORIDE 20 MG/1
20 CAPSULE, DELAYED RELEASE ORAL DAILY
Qty: 90 CAPSULE | Refills: 1 | Status: SHIPPED | OUTPATIENT
Start: 2023-07-31

## 2023-07-31 RX ORDER — OMEGA-3-ACID ETHYL ESTERS 1 G/1
2 CAPSULE, LIQUID FILLED ORAL 2 TIMES DAILY
Qty: 360 CAPSULE | Refills: 0 | Status: SHIPPED | OUTPATIENT
Start: 2023-07-31 | End: 2023-10-29
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
## 2023-08-15 ENCOUNTER — OFFICE VISIT (OUTPATIENT)
Dept: UROLOGY | Facility: CLINIC | Age: 58
End: 2023-08-15
Payer: COMMERCIAL

## 2023-08-15 ENCOUNTER — TELEPHONE (OUTPATIENT)
Dept: UROLOGY | Facility: CLINIC | Age: 58
End: 2023-08-15

## 2023-08-15 VITALS
HEART RATE: 64 BPM | BODY MASS INDEX: 24.11 KG/M2 | DIASTOLIC BLOOD PRESSURE: 84 MMHG | WEIGHT: 178 LBS | HEIGHT: 72 IN | OXYGEN SATURATION: 98 % | SYSTOLIC BLOOD PRESSURE: 136 MMHG

## 2023-08-15 DIAGNOSIS — R31.29 OTHER MICROSCOPIC HEMATURIA: ICD-10-CM

## 2023-08-15 DIAGNOSIS — R39.9 LOWER URINARY TRACT SYMPTOMS (LUTS): Primary | ICD-10-CM

## 2023-08-15 LAB
BACTERIA UR QL AUTO: ABNORMAL /HPF
BILIRUB UR QL STRIP: NEGATIVE
CAOX CRY URNS QL MICRO: ABNORMAL /HPF
CLARITY UR: ABNORMAL
COLOR UR: YELLOW
GLUCOSE UR STRIP-MCNC: NEGATIVE MG/DL
HGB UR QL STRIP.AUTO: ABNORMAL
KETONES UR STRIP-MCNC: NEGATIVE MG/DL
LEUKOCYTE ESTERASE UR QL STRIP: ABNORMAL
MUCOUS THREADS UR QL AUTO: ABNORMAL
NITRITE UR QL STRIP: NEGATIVE
NON-SQ EPI CELLS URNS QL MICRO: ABNORMAL /HPF
PH UR STRIP.AUTO: 6 [PH]
POST-VOID RESIDUAL VOLUME, ML POC: 27 ML
PROT UR STRIP-MCNC: ABNORMAL MG/DL
RBC #/AREA URNS AUTO: ABNORMAL /HPF
SL AMB  POCT GLUCOSE, UA: NORMAL
SL AMB LEUKOCYTE ESTERASE,UA: NORMAL
SL AMB POCT BILIRUBIN,UA: NORMAL
SL AMB POCT BLOOD,UA: NORMAL
SL AMB POCT CLARITY,UA: CLEAR
SL AMB POCT COLOR,UA: YELLOW
SL AMB POCT KETONES,UA: NORMAL
SL AMB POCT NITRITE,UA: NORMAL
SL AMB POCT PH,UA: 5
SL AMB POCT SPECIFIC GRAVITY,UA: 1.03
SL AMB POCT URINE PROTEIN: NORMAL
SL AMB POCT UROBILINOGEN: 0.2
SP GR UR STRIP.AUTO: 1.02 (ref 1–1.03)
UROBILINOGEN UR STRIP-ACNC: <2 MG/DL
WBC #/AREA URNS AUTO: ABNORMAL /HPF

## 2023-08-15 PROCEDURE — 81001 URINALYSIS AUTO W/SCOPE: CPT | Performed by: PHYSICIAN ASSISTANT

## 2023-08-15 PROCEDURE — 81002 URINALYSIS NONAUTO W/O SCOPE: CPT | Performed by: PHYSICIAN ASSISTANT

## 2023-08-15 PROCEDURE — 99203 OFFICE O/P NEW LOW 30 MIN: CPT | Performed by: PHYSICIAN ASSISTANT

## 2023-08-15 PROCEDURE — 51798 US URINE CAPACITY MEASURE: CPT | Performed by: PHYSICIAN ASSISTANT

## 2023-08-16 ENCOUNTER — TELEPHONE (OUTPATIENT)
Dept: UROLOGY | Facility: CLINIC | Age: 58
End: 2023-08-16

## 2023-08-16 DIAGNOSIS — R31.29 MICROHEMATURIA: Primary | ICD-10-CM

## 2023-08-16 NOTE — TELEPHONE ENCOUNTER
----- Message from Margarette Hsu PA-C sent at 8/16/2023  8:21 AM EDT -----  UA showing innumerable RBCs. Recommend CT for further evaluation. Will also need cystoscopy for full work-up as discussed at appointment.

## 2023-08-16 NOTE — TELEPHONE ENCOUNTER
[facility] asking for callback from patient, number provided, option 1 ask for Ledy or Yaritza in [facility].

## 2023-08-17 NOTE — TELEPHONE ENCOUNTER
Call made back out to patient, scheduled cysto for patient. He has scheduled his CT for 9/2023. Patient placed on wait list for sooner cysto. Patient thankful for my call back/s and time spent.

## 2023-08-17 NOTE — TELEPHONE ENCOUNTER
Spoke with patient, advised results. Gave him CS number to schedule CT. Patient was unable to schedule cysto with me due to something going on at his house- Patient will callback to schedule cysto. Patient thankful for the time I spent with him.

## 2023-08-17 NOTE — TELEPHONE ENCOUNTER
PT returning call to clinical and requesting call back regarding earlier conversation.  Pt stated he will c/b at later time due to he works outside and will not be near his phone all the time     Pt call back-[phone]

## 2023-08-17 NOTE — TELEPHONE ENCOUNTER
Patient called stating he is returning the call to the nurse.     Patient can be reached at [phone]

## 2023-08-27 ENCOUNTER — OFFICE VISIT (OUTPATIENT)
Dept: URGENT CARE | Facility: CLINIC | Age: 58
End: 2023-08-27
Payer: COMMERCIAL

## 2023-08-27 ENCOUNTER — APPOINTMENT (OUTPATIENT)
Dept: RADIOLOGY | Facility: CLINIC | Age: 58
End: 2023-08-27
Payer: COMMERCIAL

## 2023-08-27 VITALS
TEMPERATURE: 98.6 F | DIASTOLIC BLOOD PRESSURE: 60 MMHG | SYSTOLIC BLOOD PRESSURE: 113 MMHG | HEART RATE: 82 BPM | RESPIRATION RATE: 18 BRPM | OXYGEN SATURATION: 96 %

## 2023-08-27 DIAGNOSIS — S99.911A INJURY OF RIGHT ANKLE, INITIAL ENCOUNTER: ICD-10-CM

## 2023-08-27 DIAGNOSIS — S99.921A RIGHT FOOT INJURY, INITIAL ENCOUNTER: Primary | ICD-10-CM

## 2023-08-27 PROCEDURE — 99213 OFFICE O/P EST LOW 20 MIN: CPT | Performed by: NURSE PRACTITIONER

## 2023-08-27 PROCEDURE — 73630 X-RAY EXAM OF FOOT: CPT

## 2023-08-27 PROCEDURE — 73610 X-RAY EXAM OF ANKLE: CPT
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
## 2023-08-27 NOTE — PATIENT INSTRUCTIONS
No fractures seen on x-ray--the small area on the medial calcaneous that looked irregular is not where you are having pain. Radiology does the final read; if they see anything I did not, we will call you. Rest, ice often, use the ACE for support, use your crutches as needed, elevate at rest.  Foot Contusion   WHAT YOU NEED TO KNOW:   A foot contusion is a bruise to the foot. DISCHARGE INSTRUCTIONS:   Medicines:   NSAIDs:  These medicines decrease swelling and pain. NSAIDs are available without a doctor's order. Ask your healthcare provider which medicine is right for you. Ask how much to take and when to take it. Take as directed. NSAIDs can cause stomach bleeding and kidney problems if not taken correctly. Take your medicine as directed. Contact your healthcare provider if you think your medicine is not helping or if you have side effects. Tell your provider if you are allergic to any medicine. Keep a list of the medicines, vitamins, and herbs you take. Include the amounts, and when and why you take them. Bring the list or the pill bottles to follow-up visits. Carry your medicine list with you in case of an emergency. Follow up with your doctor as directed:  Write down your questions so you remember to ask them during your visits. Care for your foot: Follow your treatment plan to help decrease your pain and improve your muscle movement. Rest:  You will need to rest your foot for 1 to 2 days after your injury. This will help decrease the risk of more damage. Ice:  Ice helps decrease swelling and pain. Ice may also help prevent tissue damage. Use an ice pack, or put crushed ice in a plastic bag. Cover it with a towel and place it on your foot for 15 to 20 minutes every hour or as directed. Compression:  Compression (tight hold) provides support and helps decrease swelling and movement so your foot can heal. You may be told to keep your foot wrapped with a tight elastic bandage.  Follow instructions about how to apply your bandage. Do not massage your foot. You could cause more damage or pain. Elevation:  Keep your foot raised above the level of your heart while you are sitting or lying down. This will help decrease or limit swelling. Use pillows, blankets, or rolled towels to elevate your foot comfortably. Exercise your foot:  You may be given gentle exercises to improve your foot movement and help decrease stiffness. Ask when you can return to your normal activities or sports. Prevent another injury:   Wear equipment to protect yourself when you play sports. Make sure your shoes fit properly. Always wear shoes on streets or sidewalks. Clean spills off the floor right away to avoid slipping or hitting your foot. Make sure your home is well lit when you get up during the night. This will help you avoid hurting your foot in the dark. Contact your healthcare provider if:   You have increased swelling on your foot. You have severe foot pain. You are not able to move your foot. You have questions or concerns about your injury or treatment. © Copyright [employer] 2022 Information is for End User's use only and may not be sold, redistributed or otherwise used for commercial purposes. The above information is an  only. It is not intended as medical advice for individual conditions or treatments. Talk to your doctor, nurse or pharmacist before following any medical regimen to see if it is safe and effective for you.

## 2023-08-28 ENCOUNTER — TELEPHONE (OUTPATIENT)
Dept: URGENT CARE | Facility: CLINIC | Age: 58
End: 2023-08-28

## 2023-08-28 DIAGNOSIS — S92.811A OTHER FRACTURE OF RIGHT FOOT, INITIAL ENCOUNTER FOR CLOSED FRACTURE: Primary | ICD-10-CM

## 2023-08-28 NOTE — TELEPHONE ENCOUNTER
Contacted patient and left message regarding immediate imaging findings and Orthopedic referral placement.

## 2023-08-30 ENCOUNTER — HOSPITAL ENCOUNTER (OUTPATIENT)
Dept: CT IMAGING | Facility: CLINIC | Age: 58
Discharge: HOME | End: 2023-08-30
Payer: COMMERCIAL

## 2023-08-30 ENCOUNTER — OFFICE VISIT (OUTPATIENT)
Dept: OBGYN CLINIC | Facility: CLINIC | Age: 58
End: 2023-08-30
Payer: COMMERCIAL

## 2023-08-30 VITALS
HEIGHT: 72 IN | SYSTOLIC BLOOD PRESSURE: 131 MMHG | BODY MASS INDEX: 24.11 KG/M2 | HEART RATE: 64 BPM | WEIGHT: 178 LBS | DIASTOLIC BLOOD PRESSURE: 84 MMHG

## 2023-08-30 DIAGNOSIS — S92.014A CLOSED NONDISPLACED FRACTURE OF BODY OF RIGHT CALCANEUS, INITIAL ENCOUNTER: ICD-10-CM

## 2023-08-30 DIAGNOSIS — S92.901A MULTIPLE CLOSED FRACTURES OF RIGHT FOOT, INITIAL ENCOUNTER: Primary | ICD-10-CM

## 2023-08-30 DIAGNOSIS — S92.901A MULTIPLE CLOSED FRACTURES OF RIGHT FOOT, INITIAL ENCOUNTER: ICD-10-CM

## 2023-08-30 PROCEDURE — 99213 OFFICE O/P EST LOW 20 MIN: CPT | Performed by: FAMILY MEDICINE

## 2023-08-30 PROCEDURE — 73700 CT LOWER EXTREMITY W/O DYE: CPT

## 2023-08-30 PROCEDURE — G1004 CDSM NDSC: HCPCS
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
## 2023-09-05 ENCOUNTER — OFFICE VISIT (OUTPATIENT)
Dept: OBGYN CLINIC | Facility: CLINIC | Age: 58
End: 2023-09-05
Payer: COMMERCIAL

## 2023-09-05 VITALS
HEART RATE: 60 BPM | BODY MASS INDEX: 24.11 KG/M2 | SYSTOLIC BLOOD PRESSURE: 144 MMHG | WEIGHT: 178 LBS | DIASTOLIC BLOOD PRESSURE: 84 MMHG | HEIGHT: 72 IN

## 2023-09-05 DIAGNOSIS — S92.251A DISPLACED FRACTURE OF NAVICULAR (SCAPHOID) OF RIGHT FOOT, INITIAL ENCOUNTER FOR CLOSED FRACTURE: Primary | ICD-10-CM

## 2023-09-05 DIAGNOSIS — S92.901A MULTIPLE CLOSED FRACTURES OF RIGHT FOOT, INITIAL ENCOUNTER: ICD-10-CM

## 2023-09-05 DIAGNOSIS — S92.011A CLOSED DISPLACED FRACTURE OF BODY OF RIGHT CALCANEUS, INITIAL ENCOUNTER: ICD-10-CM

## 2023-09-05 PROCEDURE — 99243 OFF/OP CNSLTJ NEW/EST LOW 30: CPT | Performed by: ORTHOPAEDIC SURGERY

## 2023-09-05 RX ORDER — CHLORHEXIDINE GLUCONATE 0.12 MG/ML
15 RINSE ORAL ONCE
Status: CANCELLED | OUTPATIENT
Start: 2023-09-05 | End: 2023-09-05

## 2023-09-05 RX ORDER — CHLORHEXIDINE GLUCONATE 4 G/100ML
SOLUTION TOPICAL DAILY PRN
Status: CANCELLED | OUTPATIENT
Start: 2023-09-05
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
## 2023-09-05 NOTE — PATIENT INSTRUCTIONS
Levie Bumpers, M.D. Attending, [address] [place] [address] Office Phone: [phone] ? Fax: [phone] [facility] Office Phone: [phone] ? WJF:[phone]    : [place]     Surgery Coordinators Last Carlos Manuel: Tangela Rascon, [address], [phone]  Surgery Coordinator Cinthya:  Echo Sanchez, [phone]                                                               [email or web address]   PRE-OPERATIVE AND POST-OPERATIVE INSTRUCTIONS    General Information:  Your surgery is with Dr. Dyane Paget. Dates can change (although rare) depending on emergencies. Typical post operative visits are at the following intervals:  3 weeks post surgery(except 1 week for bunions and wound monitoring), 6 weeks post surgery, 3 months post surgery, 6 months post surgery, and then on a yearly basis. However, this may change based on Dr. Sonya Orozco recommendation. #1 post-operative rule for foot/ankle surgery:  ONCE YOU ARE OUT OF YOUR CAST AND/OR REMOVABLE BOOT, SWELLING MAY PERSIST FOR MANY MONTHS. YOU MIGHT ALSO EXPERIENCE A BLUISH DISCOLORATION OF YOUR LEG. THIS IS NORMAL AND PART OF THE USUAL POSTOPERATIVE EXPERIENCE. SMOKING:  Smoking results in incomplete healing of fractures (broken bones) and joints that my have been fused. Smoking and nicotine also prevents the growth of bone into ankle replacements and bone healing. It also slows the healing of muscles and skin (soft tissue). Therefore, please do not have surgery if you continue to smoke. We reserve the right to cancel your surgery if we suspect that you are smoking. DO NOT use nicorette gum or other patches. Please find an alternative method to quit smoking before your surgery. Pre-Operative Information:  Surgery date and preoperative visits:   If you have medical problems, such as an abnormal EKG, history of BLOOD CLOT, ANEURYSM, and any other heart condition, please inform us so that we can get your medical clearance several weeks before the surgery. Please bring any important medical information, such as an EKG, chest x-ray, or echocardiogram, with you to ensure that your surgery will not be delayed. If needed, you will receive your preoperative appointments in the mail or by phone from our scheduling office. The location of the preoperative appointment will be given to you also. You may not eat after midnight the night before surgery. If you do, your surgery will be cancelled. You will receive a phone call from your surgery center the day before your surgery (if your surgery is on a Monday, you will get a call the Friday before). If you do not hear from someone by 4pm the day before your surgery, please call the Surgical coordinator (number above) to notify us. Start taking Vitamin D3 4000 units per day and Calcium 1200mg per day immediately. You will continue this until your 3 month post-op visit. These are over the counter and available at all pharmacies and supermarkets. FOR THOSE HAVING SURGERY AT [facility] WILL NEED CRUTCHES OR A ROLLING WALKER AFTER SURGERY, ASK FOR A PRESCRIPTION FOR THIS FROM OUR OFFICE TODAY. THIS CANNOT BE HANDLED THE DAY OF SURGERY AS [facility] DOES NOT STOCK THESE. Because bacterial can often enter any defect in the skin, it is important to avoid any cuts before surgery. Any breaks in the skin on the leg will often result in your surgery being postponed. Please avoid going on a very long walk the day prior to surgery, or doing other activities that could lead to irritation of the skin, including yard work, extra athletic activity, or shaving. This could result in surgery cancellation. You MUST be fasting the day of your surgery. Therefore, please do not consume any foot or beverage after midnight the night before surgery.   The morning of surgery you may take your usual medications with a sip of water. It is important not to take anti-inflammatory medication like Ibuprofen, Motrin, Naproxen (Aleve), or Aspirin 7-10 days before surgery because they will make you bleed more than usual.  Vitamin, E, Plavix and Coumadin also have the same effect. Stop Aspirin and Vitamin E two weeks before surgery. YOUR MEDICAL DOCTOR SHOULD TELL YOU WHEN TO STOP COUMADIN OR PLAVIX. If your surgery involves any bone healing, please do not take anti-inflammatories for at least 6 weeks after surgery. This can impede bone healing (ibuprofen, Aleve, Relafen, iodine). Tylenol is fine to take. PREOPERATIVE BATHING INSTRUCTIONS:    Before your surgery, bathe with Hibiclens (4% Chlorhexidene) as instructed below. This skin cleanser will help reduce the bacteria on your skin before surgery. To avoid irritating your eyes, do not apply Hibiclens above the level of your neck. On the evening before AND the morning of surgery, bathe your entire body except the face and scalp, then rinse freely. DO NOT apply to your face or scalp, as Hibiclens can irritate your eyes. Purchasing information:   Hibiclens is available without a prescription at [facility]. ADDITIONAL INSTRUCTIONS:  PATIENTS HAVING FOOT/ANKLE SURGERY     In preparation for your upcoming surgery, we kindly request and advise the following:  Notify our office if you are taking any of the following:  Coumadin (warfarin):  Persantine (dipyridamole); Pletal (cilostazol); Plavix (clopidogrel); Ticlid (ticlopidine); Agrylin (anagrelide); Aggrenox (dipyridamole and aspirin) or other blood thinners,. In addition, stop taking Vitamin E and herbal supplements. Do not schedule any elective dental work for at least 6 months after surgery. If you had an ankle replacement, you will need to take antibiotics before any future dental procedures. Your dentist or our office can prescribe these for you.   1000mg of Amoxicillin 1 hour prior to any dental procedure is the recommended dosing. THREE RULES:    After surgery you will most likely be given the instructions “KEEP YOUR TOES ABOVE YOUR NOSE.”  This means that you MUST have your feet elevated higher than your heart. Keeping your toes above your nose helps to heal the muscles and skin (soft tissues) by reducing swelling in your leg. This position also helps to prevent infection, and is very important in avoiding deep venous thrombosis (blood clots). In order to keep the blood circulating in your legs and in order to avoid deep vein   thrombosis (blood clots), we ask patients to GET UP ONCE AN HOUR during the day. This means you should at least cross the room and come back. It does not mean you have to be up for long periods of time. In most cases we will not have people immediately put any weight on their operated part. This is important to prevent loosening of metal or other devices holding the bones together. It also prevents irritation of the soft tissues which can lead to prolonged healing. When we say get up once an hour, please walk, hop or move with an assisted device. This is important! Do not do any excessive walking during the first few days after surgery. Recovering from surgery is a full-time task for the patient. Postoperative care is important to avoid irritating the skin incision, which can lead to infection. Please do not plan activities or go out of town for several weeks after surgery. If you are unsure about your future activities, please schedule surgery only when you know it is acceptable for you. Scheduling surgery and then canceling the date, prevents other people from having surgery on that date as it takes time to line everything up effectively. If you cancel your surgery the week of your planned surgery, we reserve the right to cancel all future surgical procedures.     THE DAY OF SURGERY:    Arrival to the hospital or outpatient surgical center on time is imperative. If you arrive late, then your surgery will be cancelled. You MUST have a family member/friend bring you, stay with you throughout the DURATION of your surgery, and drive you home. You MUST be fasting the day of your surgery. Therefore, do not consume any food or beverage after midnight the night before surgery. At your pre-operative visit with the anesthesia staff, or during your phone screen, a nurse will instruct you what medications you will need to take the day of surgery. MAKE SURE THAT THE PHARMACY LISTED IN THE ELECTRONIC MEDICAL RECORD (EPIC) IS YOUR PREFERRED PHARMACY. For example, if you are staying with family or a friend, and will not be near your preferred pharmacy, YOU MUST, tell the nurses checking you in the day of surgery so that this can be changed in the system. If your prescriptions are sent to a pharmacy, this cannot be changed. AFTER YOUR SURGERY:  Bleeding through the bandage almost always occurs. Do not let this alarm you. Simply add more gauze or a towel, call us, and come in for a dressing change. If you think it is excessive, contact us immediately or go to the local emergency room. Do not get the bandage wet. Showering is possible with plastic protectors. Be very careful, as the bathroom can be wet and slippery. If you do get your dressing wet, it should be changed immediately. Please contact us. ONCE YOUR ARE OUT OF YOUR CAST AND/OR REMOVABLE BOOT, SWELLING MAY PERSIST FOR MANY MONTHS. YOU MIGHT ALSO EXPERIENCE A BLUISH DISCOLORATION OF YOUR LEG. THIS IS NORMAL AND PART OF THE USUAL POSTOPERATIVE EXPERIENCE. WEARING COMPRESSION HOSE (ELASTIC STOCKINGS) CAN HELP AVOID SOME OF THIS SWELLING. DRESSING:   The purpose of the surgical dressing is to keep your wound and the surgical site protected from the environment.   Most dressings contain splints, which help to hold your foot and ankle in a corrected position, and also allow the surgical site to heal properly. Dressings will remain in place and undisturbed until the first postop visit. If you have a drain in place, this will need to be removed in 1-3 days after surgery. The time for the drain to be pulled will be written on your discharge instruction sheet. CAST  INSTRUCTIONS:  You may or may not get a cast following surgery. If you do, pay close attention to the following:    After application of a splint or cast, it is very important to elevate your leg for 24 to 72 hours. The injured area should be elevated well above the heart. Remember “Toes above your Nose”. Rest and elevation greatly reduce pain and speed the healing process by minimizing early swelling. CALL YOUR DOCTORS OFFICE OR VISIT LOCATION EMERGENCY ROOM IF YOU HAVE ANY OF THE FOLLOWING:    Significant increased pain, which may be caused by swelling, and the feeling that the splint or cast is too tight  Numbness and tingling in your hand or foot, which may be caused by too much pressure on the nerves  Burning and stinging, which may be caused by too much pressure on the skin  Excessive swelling below the cast, which may mean the cast is slowing your blood circulation  Loss of active movement of toes, which request an urgent evaluation  Loss of “capillary refill”. Pinch the tip of toes and navi the skin. Release pressure and if the skin does not return pink then call the office immediately. DO NOT GET YOUR CAST WET. Bacteria thrive in moist dark areas. We do not want this. If your cast becomes wet, return to the office and we will apply another one. PAIN AFTER SURGERY:  Narcotic pain medication can and will depress your respiratory system if taken in excess. The goal of pain management with narcotics is to be comfortable not pain free. If you take enough narcotics to be pain free then you run the risk of stopping breathing. If this happens, call 911 immediately!   Pain in the heel is often caused by pressure from the weight of your foot on the bed. Make sure your heel is suspended off the bed by keeping a pillow underneath your calf not your knee. Medications: You will be given narcotic pain medication. Do NOT drive while taking narcotic medications. Medications such as Darvocet, Percocet, Vicoden or Tylenol #3, also contain acetaminophen (Tylenol). Do not take acetaminophen or Tylenol from home when taking theses medications. When you fill your prescription, you may ask the pharmacist if your pain medication has acetaminophen/Tylenol in it. It is okay to take Tylenol with Oxycontin/Oxycodone. Should you have pain after taking your prescription medication, ibuprophen (Motrin, Advil, and Alleve) is a common over the counter preparation and may often be taken with the prescription pain medication as long as you take them with food. These medications can irritate the stomach lining. Unless you are allergic to aspirin or currently taking a blood thinner, Dr. Cony Walters patients are requested to take one 325 mg aspirin every 12 hours until you are back to walking normally after surgery (This can be up to 6 weeks). Narcotic medications commonly cause nausea. Taking them with food will decrease this side effect. If you are having extreme nausea, please contact us for an alternative medication or for something that can be taken with this medication to decrease the nausea. Also, narcotic medications frequently cause constipation. An increase of fiber, fruits and vegetables in your diet may alleviate this problem, or if necessary, you may use an over-the-counter medication such as senekot, colace, or Fibercon for constipation problems. You should resume all medications you were taking prior to the surgery unless otherwise specified. Activity:   Because of your recent foot surgery, your activity level will decrease.  You will need to elevate your foot ABOVE the level of your heart for a minimum of four days. The length of time necessary for the swelling to go down, and for your wounds to heal properly depends greatly on your efforts here. Elevation is extremely important to avoid compromising the blood supply to your foot. Remember when your foot is down it will swell, which will increase pain and slow healing. Wiggle your toes frequently if possible. If you go home with a regional block, (a type of anesthesia) the foot and leg will be numb. Think of ways to get into your house and around the house until the block wears off. Keep in mind that it may be a legal issue if you drive while in a cast or splint, especially when the splint is on the right foot. You may call the Department of Motor Vehicles to schedule a road test if you have adaptive equipment applied to your car. The amount of weight you are allowed to bear on your foot will be written on your discharge sheet filled out at the time of surgery. The following is an explanation of the possibilities:       [id number] has the following policies when it comes to ELECTIVE surgery  No elective surgery requiring anesthesia until 7 weeks after a patient tested positive for COVID-19   No elective surgery requiring anesthesia until 3 months after a patient was hospitalized for COVID-19      Non-weight bearing: You are to put NO weight whatsoever on your foot. When using crutches or a walker, your foot should not touch the ground, except when you are standing. Then, it may rest on the ground. If you are to be non-weight bearing, and you are not compliant, you could compromise the surgery. Some of our patients have been requesting prescriptions for a roll-a-bout knee scooter. [employer] and other insurances have been denying these claims, and you may either have to rent one or pay out of pocket to purchase one.   THIS SHOULD BE PURCHASED PRIOR TO THE SURGERY AND YOU SHOULD BRING IT WITH YOU THE DAY OF THE SURGERY TO AIDE IN GETTING FROM THE CAR INTO THE HOUSE AFTER SURGERY.
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
## 2023-09-08 ENCOUNTER — TELEPHONE (OUTPATIENT)
Age: 58
End: 2023-09-08

## 2023-09-08 RX ORDER — ACETAMINOPHEN 500 MG
500 TABLET ORAL EVERY 6 HOURS PRN
COMMUNITY

## 2023-09-08 RX ORDER — IBUPROFEN 200 MG
TABLET ORAL EVERY 6 HOURS PRN
COMMUNITY
End: 2023-09-11

## 2023-09-08 NOTE — TELEPHONE ENCOUNTER
Caller: patient    Doctor: Dorothy Sierra    Reason for call: patient is scheduled for surgery on Monday.  Patient spoke to PAT this morning and patient was informed to contact the office to find out if they should spot taking the baby aspirin or if they are able to continue to take it    Call back#: [phone]

## 2023-09-08 NOTE — PRE-PROCEDURE INSTRUCTIONS
Pre-Surgery Instructions:   Medication Instructions   • acetaminophen (TYLENOL) 500 mg tablet Uses PRN- OK to take day of surgery   • aspirin 81 mg chewable tablet 9/8 pt calling surg office and PP msg to surg [facility] for instructions   • atorvastatin (LIPITOR) 10 mg tablet Take night before surgerypt takes with food at lunch   • Boswellia-Glucosamine-Vit D (OSTEO BI-FLEX ONE PER DAY PO) Stop taking 7 days prior to surgery. • DULoxetine (CYMBALTA) 20 mg capsule Take night before surgerytakes with food at lunch   • ibuprofen (Advil) 200 mg tablet Stop taking 3 days prior to surgery. • Misc Natural Products (Turmeric Curcumin) CAPS Stop taking 7 days prior to surgery. • Multiple Vitamins-Minerals (Mens Multi Vitamin & Mineral) TABS Stop taking 7 days prior to surgery. • omega-3-acid ethyl esters (LOVAZA) 1 g capsule Stop taking 7 days prior to surgery. Medication instructions for day surgery reviewed. Please use only a sip of water to take your instructed medications. Avoid all over the counter vitamins, supplements and NSAIDS for one week prior to surgery per anesthesia guidelines. Tylenol is ok to take as needed. You will receive a call one business day prior to surgery with an arrival time and hospital directions. If your surgery is scheduled on a Monday, the hospital will be calling you on the Friday prior to your surgery. If you have not heard from anyone by 8pm, please call the hospital supervisor through the hospital  at [phone]. Yasmin Baird [phone]). Do not eat or drink anything after midnight the night before your surgery, including candy, mints, lifesavers, or chewing gum. Do not drink alcohol 24hrs before your surgery. Try not to smoke at least 24hrs before your surgery. Follow the pre surgery showering instructions as listed in the [facility] Surgical Experience Booklet” or otherwise provided by your surgeon's office.  Do not shave the surgical area 24 hours before surgery. Do not apply any lotions, creams, including makeup, cologne, deodorant, or perfumes after showering on the day of your surgery. No contact lenses, eye make-up, or artificial eyelashes. Remove nail polish, including gel polish, and any artificial, gel, or acrylic nails if possible. Remove all jewelry including rings and body piercing jewelry. Wear causal clothing that is easy to take on and off. Consider your type of surgery. Keep any valuables, jewelry, piercings at home. Please bring any specially ordered equipment (sling, braces) if indicated. Arrange for a responsible person to drive you to and from the hospital on the day of your surgery. Visitor Guidelines discussed. Call the surgeon's office with any new illnesses, exposures, or additional questions prior to surgery. Please reference your [facility] Surgical Experience Booklet” for additional information to prepare for your upcoming surgery.

## 2023-09-11 ENCOUNTER — HOSPITAL ENCOUNTER (OUTPATIENT)
Facility: HOSPITAL | Age: 58
Setting detail: OUTPATIENT SURGERY
Discharge: HOME | End: 2023-09-11
Attending: ORTHOPAEDIC SURGERY | Admitting: ORTHOPAEDIC SURGERY
Payer: COMMERCIAL

## 2023-09-11 ENCOUNTER — ANESTHESIA EVENT (OUTPATIENT)
Dept: PERIOP | Facility: HOSPITAL | Age: 58
End: 2023-09-11
Payer: COMMERCIAL

## 2023-09-11 ENCOUNTER — APPOINTMENT (OUTPATIENT)
Dept: RADIOLOGY | Facility: HOSPITAL | Age: 58
End: 2023-09-11
Payer: COMMERCIAL

## 2023-09-11 ENCOUNTER — ANESTHESIA (OUTPATIENT)
Dept: PERIOP | Facility: HOSPITAL | Age: 58
End: 2023-09-11
Payer: COMMERCIAL

## 2023-09-11 VITALS
RESPIRATION RATE: 16 BRPM | HEIGHT: 71 IN | TEMPERATURE: 97.3 F | DIASTOLIC BLOOD PRESSURE: 76 MMHG | HEART RATE: 70 BPM | WEIGHT: 176.37 LBS | BODY MASS INDEX: 24.69 KG/M2 | SYSTOLIC BLOOD PRESSURE: 134 MMHG | OXYGEN SATURATION: 97 %

## 2023-09-11 DIAGNOSIS — S92.014A CLOSED NONDISPLACED FRACTURE OF BODY OF RIGHT CALCANEUS, INITIAL ENCOUNTER: Primary | ICD-10-CM

## 2023-09-11 DIAGNOSIS — S92.251A DISPLACED FRACTURE OF NAVICULAR (SCAPHOID) OF RIGHT FOOT, INITIAL ENCOUNTER FOR CLOSED FRACTURE: ICD-10-CM

## 2023-09-11 PROCEDURE — C9290 INJ, BUPIVACAINE LIPOSOME: HCPCS | Performed by: ANESTHESIOLOGY

## 2023-09-11 PROCEDURE — C1713 ANCHOR/SCREW BN/BN,TIS/BN: HCPCS | Performed by: ORTHOPAEDIC SURGERY

## 2023-09-11 PROCEDURE — 28415 OPTX CALCANEAL FRACTURE: CPT | Performed by: ORTHOPAEDIC SURGERY

## 2023-09-11 PROCEDURE — 28465 OPTX TARSAL BONE FX EACH: CPT | Performed by: ORTHOPAEDIC SURGERY

## 2023-09-11 PROCEDURE — NC001 PR NO CHARGE

## 2023-09-11 PROCEDURE — C1769 GUIDE WIRE: HCPCS | Performed by: ORTHOPAEDIC SURGERY

## 2023-09-11 PROCEDURE — 73650 X-RAY EXAM OF HEEL: CPT

## 2023-09-11 DEVICE — BONE SCREW
Type: IMPLANTABLE DEVICE | Site: FOOT | Status: FUNCTIONAL
Brand: VARIAX

## 2023-09-11 DEVICE — CALCANEUS STANDARD PLATE, SMALL
Type: IMPLANTABLE DEVICE | Site: FOOT | Status: FUNCTIONAL
Brand: VARIAX

## 2023-09-11 DEVICE — LOCKING SCREW
Type: IMPLANTABLE DEVICE | Site: FOOT | Status: FUNCTIONAL
Brand: VARIAX

## 2023-09-11 DEVICE — CANNULATED SCREW
Type: IMPLANTABLE DEVICE | Site: FOOT | Status: FUNCTIONAL
Brand: ASNIS

## 2023-09-11 DEVICE — WASHER
Type: IMPLANTABLE DEVICE | Site: FOOT | Status: FUNCTIONAL
Brand: VARIAX

## 2023-09-11 RX ORDER — SUCCINYLCHOLINE/SOD CL,ISO/PF 100 MG/5ML
SYRINGE (ML) INTRAVENOUS AS NEEDED
Status: DISCONTINUED | OUTPATIENT
Start: 2023-09-11 | End: 2023-09-11

## 2023-09-11 RX ORDER — PROMETHAZINE HYDROCHLORIDE 25 MG/ML
12.5 INJECTION, SOLUTION INTRAMUSCULAR; INTRAVENOUS ONCE AS NEEDED
Status: DISCONTINUED | OUTPATIENT
Start: 2023-09-11 | End: 2023-09-11 | Stop reason: HOSPADM

## 2023-09-11 RX ORDER — VANCOMYCIN HYDROCHLORIDE 1 G/20ML
INJECTION, POWDER, LYOPHILIZED, FOR SOLUTION INTRAVENOUS AS NEEDED
Status: DISCONTINUED | OUTPATIENT
Start: 2023-09-11 | End: 2023-09-11 | Stop reason: HOSPADM

## 2023-09-11 RX ORDER — MAGNESIUM HYDROXIDE 1200 MG/15ML
LIQUID ORAL AS NEEDED
Status: DISCONTINUED | OUTPATIENT
Start: 2023-09-11 | End: 2023-09-11 | Stop reason: HOSPADM

## 2023-09-11 RX ORDER — MIDAZOLAM HYDROCHLORIDE 2 MG/2ML
INJECTION, SOLUTION INTRAMUSCULAR; INTRAVENOUS AS NEEDED
Status: DISCONTINUED | OUTPATIENT
Start: 2023-09-11 | End: 2023-09-11

## 2023-09-11 RX ORDER — ASPIRIN 325 MG
325 TABLET, DELAYED RELEASE (ENTERIC COATED) ORAL 2 TIMES DAILY
Qty: 84 TABLET | Refills: 0 | Status: SHIPPED | OUTPATIENT
Start: 2023-09-11

## 2023-09-11 RX ORDER — SODIUM CHLORIDE, SODIUM LACTATE, POTASSIUM CHLORIDE, CALCIUM CHLORIDE 600; 310; 30; 20 MG/100ML; MG/100ML; MG/100ML; MG/100ML
INJECTION, SOLUTION INTRAVENOUS CONTINUOUS PRN
Status: DISCONTINUED | OUTPATIENT
Start: 2023-09-11 | End: 2023-09-11

## 2023-09-11 RX ORDER — ONDANSETRON 2 MG/ML
4 INJECTION INTRAMUSCULAR; INTRAVENOUS ONCE AS NEEDED
Status: DISCONTINUED | OUTPATIENT
Start: 2023-09-11 | End: 2023-09-11 | Stop reason: HOSPADM

## 2023-09-11 RX ORDER — DEXAMETHASONE SODIUM PHOSPHATE 10 MG/ML
INJECTION, SOLUTION INTRAMUSCULAR; INTRAVENOUS AS NEEDED
Status: DISCONTINUED | OUTPATIENT
Start: 2023-09-11 | End: 2023-09-11

## 2023-09-11 RX ORDER — OXYCODONE HYDROCHLORIDE 5 MG/1
5 TABLET ORAL EVERY 4 HOURS PRN
Qty: 30 TABLET | Refills: 0 | Status: SHIPPED | OUTPATIENT
Start: 2023-09-11

## 2023-09-11 RX ORDER — HYDROMORPHONE HCL/PF 1 MG/ML
0.5 SYRINGE (ML) INJECTION
Status: DISCONTINUED | OUTPATIENT
Start: 2023-09-11 | End: 2023-09-11 | Stop reason: HOSPADM

## 2023-09-11 RX ORDER — FENTANYL CITRATE/PF 50 MCG/ML
25 SYRINGE (ML) INJECTION
Status: DISCONTINUED | OUTPATIENT
Start: 2023-09-11 | End: 2023-09-11 | Stop reason: HOSPADM

## 2023-09-11 RX ORDER — CHLORHEXIDINE GLUCONATE ORAL RINSE 1.2 MG/ML
15 SOLUTION DENTAL ONCE
Status: DISCONTINUED | OUTPATIENT
Start: 2023-09-11 | End: 2023-09-11 | Stop reason: HOSPADM

## 2023-09-11 RX ORDER — PROPOFOL 10 MG/ML
INJECTION, EMULSION INTRAVENOUS AS NEEDED
Status: DISCONTINUED | OUTPATIENT
Start: 2023-09-11 | End: 2023-09-11

## 2023-09-11 RX ORDER — CHLORHEXIDINE GLUCONATE 4 G/100ML
SOLUTION TOPICAL DAILY PRN
Status: DISCONTINUED | OUTPATIENT
Start: 2023-09-11 | End: 2023-09-11 | Stop reason: HOSPADM

## 2023-09-11 RX ORDER — ONDANSETRON 4 MG/1
4 TABLET, FILM COATED ORAL EVERY 8 HOURS PRN
Qty: 10 TABLET | Refills: 0 | Status: SHIPPED | OUTPATIENT
Start: 2023-09-11

## 2023-09-11 RX ORDER — BUPIVACAINE HYDROCHLORIDE 5 MG/ML
INJECTION, SOLUTION EPIDURAL; INTRACAUDAL
Status: COMPLETED | OUTPATIENT
Start: 2023-09-11 | End: 2023-09-11

## 2023-09-11 RX ORDER — OXYCODONE HYDROCHLORIDE 5 MG/1
5 TABLET ORAL ONCE AS NEEDED
Status: COMPLETED | OUTPATIENT
Start: 2023-09-11 | End: 2023-09-11

## 2023-09-11 RX ORDER — ALBUTEROL SULFATE 2.5 MG/3ML
2.5 SOLUTION RESPIRATORY (INHALATION) ONCE AS NEEDED
Status: DISCONTINUED | OUTPATIENT
Start: 2023-09-11 | End: 2023-09-11 | Stop reason: HOSPADM

## 2023-09-11 RX ORDER — LIDOCAINE HYDROCHLORIDE 10 MG/ML
INJECTION, SOLUTION EPIDURAL; INFILTRATION; INTRACAUDAL; PERINEURAL AS NEEDED
Status: DISCONTINUED | OUTPATIENT
Start: 2023-09-11 | End: 2023-09-11

## 2023-09-11 RX ORDER — CEFAZOLIN SODIUM 2 G/50ML
2000 SOLUTION INTRAVENOUS ONCE
Status: COMPLETED | OUTPATIENT
Start: 2023-09-11 | End: 2023-09-11

## 2023-09-11 RX ORDER — ROCURONIUM BROMIDE 10 MG/ML
INJECTION, SOLUTION INTRAVENOUS AS NEEDED
Status: DISCONTINUED | OUTPATIENT
Start: 2023-09-11 | End: 2023-09-11

## 2023-09-11 RX ORDER — MEPERIDINE HYDROCHLORIDE 25 MG/ML
12.5 INJECTION INTRAMUSCULAR; INTRAVENOUS; SUBCUTANEOUS
Status: DISCONTINUED | OUTPATIENT
Start: 2023-09-11 | End: 2023-09-11 | Stop reason: HOSPADM

## 2023-09-11 RX ORDER — ONDANSETRON 2 MG/ML
INJECTION INTRAMUSCULAR; INTRAVENOUS AS NEEDED
Status: DISCONTINUED | OUTPATIENT
Start: 2023-09-11 | End: 2023-09-11

## 2023-09-11 RX ORDER — PHENYLEPHRINE HCL IN 0.9% NACL 1 MG/10 ML
SYRINGE (ML) INTRAVENOUS AS NEEDED
Status: DISCONTINUED | OUTPATIENT
Start: 2023-09-11 | End: 2023-09-11

## 2023-09-11 RX ORDER — FENTANYL CITRATE 50 UG/ML
INJECTION, SOLUTION INTRAMUSCULAR; INTRAVENOUS AS NEEDED
Status: DISCONTINUED | OUTPATIENT
Start: 2023-09-11 | End: 2023-09-11

## 2023-09-11 RX ADMIN — PROPOFOL 300 MG: 10 INJECTION, EMULSION INTRAVENOUS at 10:17

## 2023-09-11 RX ADMIN — BUPIVACAINE HYDROCHLORIDE 10 ML: 5 INJECTION, SOLUTION EPIDURAL; INTRACAUDAL; PERINEURAL at 08:41

## 2023-09-11 RX ADMIN — Medication 50 MCG: at 11:56

## 2023-09-11 RX ADMIN — SODIUM CHLORIDE, SODIUM LACTATE, POTASSIUM CHLORIDE, AND CALCIUM CHLORIDE: .6; .31; .03; .02 INJECTION, SOLUTION INTRAVENOUS at 08:40

## 2023-09-11 RX ADMIN — BUPIVACAINE HYDROCHLORIDE 15 ML: 5 INJECTION, SOLUTION EPIDURAL; INTRACAUDAL at 08:41

## 2023-09-11 RX ADMIN — CEFAZOLIN SODIUM 2000 MG: 2 SOLUTION INTRAVENOUS at 10:25

## 2023-09-11 RX ADMIN — SUGAMMADEX 200 MG: 100 INJECTION, SOLUTION INTRAVENOUS at 11:46

## 2023-09-11 RX ADMIN — OXYCODONE HYDROCHLORIDE 5 MG: 5 TABLET ORAL at 13:15

## 2023-09-11 RX ADMIN — LIDOCAINE HYDROCHLORIDE 50 MG: 10 INJECTION, SOLUTION EPIDURAL; INFILTRATION; INTRACAUDAL; PERINEURAL at 10:17

## 2023-09-11 RX ADMIN — BUPIVACAINE 20 ML: 13.3 INJECTION, SUSPENSION, LIPOSOMAL INFILTRATION at 08:42

## 2023-09-11 RX ADMIN — Medication 100 MG: at 10:17

## 2023-09-11 RX ADMIN — MIDAZOLAM 2 MG: 1 INJECTION INTRAMUSCULAR; INTRAVENOUS at 10:11

## 2023-09-11 RX ADMIN — Medication 100 MCG: at 11:44

## 2023-09-11 RX ADMIN — DEXAMETHASONE SODIUM PHOSPHATE 10 MG: 10 INJECTION, SOLUTION INTRAMUSCULAR; INTRAVENOUS at 10:39

## 2023-09-11 RX ADMIN — ROCURONIUM BROMIDE 30 MG: 10 INJECTION INTRAVENOUS at 10:25

## 2023-09-11 RX ADMIN — FENTANYL CITRATE 25 MCG: 50 INJECTION, SOLUTION INTRAMUSCULAR; INTRAVENOUS at 10:41

## 2023-09-11 RX ADMIN — ONDANSETRON 4 MG: 2 INJECTION INTRAMUSCULAR; INTRAVENOUS at 10:38

## 2023-09-11 RX ADMIN — ROCURONIUM BROMIDE 20 MG: 10 INJECTION INTRAVENOUS at 11:26

## 2023-09-11 RX ADMIN — SODIUM CHLORIDE, SODIUM LACTATE, POTASSIUM CHLORIDE, AND CALCIUM CHLORIDE: .6; .31; .03; .02 INJECTION, SOLUTION INTRAVENOUS at 11:26

## 2023-09-11 NOTE — DISCHARGE INSTR - AVS FIRST PAGE
Bharathi Waters M.D. Attending, [address] [place] [address] Office Phone: [phone] ? Fax: [phone] [address] [facility] Office Phone: [phone] ? CEA:[phone]    : Guy Sánchez [place]     Surgery Coordinators [facility]: Alley Pritchett, [phone]  Isra Ramos, [phone]  Surgery Coordinator [place]:  Shailesh Landin, [phone]                                                              [email or web address]   POST-OPERATIVE INSTRUCTIONS    General Information:  Typical post operative visits are at the following intervals:  2-3 weeks post surgery, 6 weeks post surgery, 3 months post surgery, 6 months post surgery, and then on a yearly basis. However, this may change based on Dr. Casandra Smart recommendation. #1 post-operative rule for foot/ankle surgery:  ONCE YOU ARE OUT OF YOUR CAST AND/OR REMOVABLE BOOT, SWELLING MAY PERSIST FOR MANY MONTHS. YOU MIGHT ALSO EXPERIENCE A BLUISH DISCOLORATION OF YOUR LEG. THIS IS NORMAL AND PART OF THE USUAL POSTOPERATIVE EXPERIENCE. DO NOT WAIT UNTIL YOUR BLOCK WEARS OFF TO TAKE YOUR PAIN MEDICATION. IT TAKES A FEW DOSES OF THE PAIN MEDICATION TO REACH A THERAPEUTIC LEVEL. TAKE A TABLET PROACTIVELY BEFORE YOU HAVE ANY PAIN AND AGAIN 4 HOURS LATER SO WHEN THE BLOCK WEARS OFF, YOU ARE NOT CAUGHT OFF GUARD. SMOKING:  Smoking results in incomplete healing of fractures (broken bones) and joints that my have been fused. Smoking and nicotine also prevents the growth of bone into ankle replacements and bone healing. It also slows the healing of muscles and skin (soft tissue). Therefore, please do not have surgery if you continue to smoke. We reserve the right to cancel your surgery if we suspect that you are smoking. DO NOT use nicorette gum or other patches.   Please find an alternative method to quit smoking before your surgery and do not restart after surgery to allow for healing. THREE RULES:    After surgery you will most likely be given the instructions “KEEP YOUR TOES ABOVE YOUR NOSE.”  This means that you MUST have your feet elevated higher than your heart. Keeping your toes above your nose helps to heal the muscles and skin (soft tissues) by reducing swelling in your leg. This position also helps to prevent infection, and is very important in avoiding deep venous thrombosis (blood clots). In order to keep the blood circulating in your legs and in order to avoid deep vein   thrombosis (blood clots), we ask patients to GET UP ONCE AN HOUR during the day. This means you should at least cross the room and come back. It does not mean you have to be up for long periods of time. In most cases we will not have people immediately put any weight on their operated part. This is important to prevent loosening of metal or other devices holding the bones together. It also prevents irritation of the soft tissues which can lead to prolonged healing. When we say get up once an hour, please walk, hop or move with an assisted device. This is important! Do not do any excessive walking during the first few days after surgery. Recovering from surgery is a full-time task for the patient. Postoperative care is important to avoid irritating the skin incision, which can lead to infection. Please do not plan activities or go out of town for several weeks after surgery. AFTER YOUR SURGERY:  Bleeding through the bandage almost always occurs. Do not let this alarm you. Simply overwrap with an ABD pad and Ace bandage (The nurses discharging your from the day of surgery will provide this.)   If you think it is excessive, you can come in early for a dressing change (at around 1 week instead of 3 weeks postop.)    Do not get the bandage wet. Showering is possible with plastic protectors. Be very careful, as the bathroom can be wet and slippery.   If you do get your dressing wet, it should be changed immediately. Please contact us. ONCE YOUR ARE OUT OF YOUR CAST AND/OR REMOVABLE BOOT, SWELLING MAY PERSIST FOR MANY MONTHS. THERE WILL ALSO BE A BLUISH DISCOLORATION OF YOUR LEG FOR MONTHS. THIS IS NORMAL AND PART OF THE USUAL POSTOPERATIVE EXPERIENCE. WEARING COMPRESSION HOSE (ELASTIC STOCKINGS) CAN HELP AVOID SOME OF THIS SWELLING. Ice the area 20 minutes every hour once the nerve block wears off. If you are in a cast or a splint, you may need to leave the ice on longer than 20 minutes in order to feel any benefits. DRESSING:   The purpose of the surgical dressing is to keep your wound and the surgical site protected from the environment. Most dressings contain splints, which help to hold your foot and ankle in a corrected position, and also allow the surgical site to heal properly. If you have a drain in place, this will need to be removed in 1 day after surgery. The time for the drain to be pulled will be written on your discharge instruction sheet. CAST  INSTRUCTIONS:  You may or may not get a cast following surgery. If you do, pay close attention to the following:    After application of a splint or cast, it is very important to elevate your leg for 24 to 72 hours. The injured area should be elevated well above the heart. Remember “Toes above your Nose”. Rest and elevation greatly reduce pain and speed the healing process by minimizing early swelling.     CALL YOUR DOCTORS OFFICE OR VISIT LOCATION EMERGENCY ROOM IF YOU HAVE ANY OF THE FOLLOWING:    Significant increased pain, which may be caused by swelling (Strict elevation will alleviate this)  Numbness and tingling in your hand or foot, which may be caused by too much pressure on the nerves (There is always some numbness after surgery due to nerve blocks)  Burning and stinging, which may be caused by too much pressure on the skin  Excessive swelling below the cast, which may mean the cast is slowing your blood circulation  Loss of active movement of toes, which request an urgent evaluation  Loss of “capillary refill”. Pinch the tip of toes and navi the skin. Release pressure and if the skin does not return pink then call the office immediately. DO NOT GET YOUR CAST WET. Bacteria thrive in moist dark areas. We do not want this. If your cast becomes wet, return to the office and we will apply another one. PAIN AFTER SURGERY:  Narcotic pain medication can and will depress your respiratory system if taken in excess. The goal of pain management with narcotics is to be comfortable not pain free. If you take enough narcotics to be pain free then you run the risk of stopping breathing. If this happens, call 911 immediately! Pain in the heel is often  caused by pressure from the weight of your foot on the bed. Make sure your heel is suspended off the bed by keeping a pillow underneath your calf not your knee. Medications: You will be given narcotic pain medication. Do NOT drive while taking narcotic medications. Medications such as Darvocet, Percocet, Vicoden or Tylenol #3, also contain acetaminophen (Tylenol). Do not take acetaminophen or Tylenol from home when taking theses medications. When you fill your prescription, you may ask the pharmacist if your pain medication has acetaminophen/Tylenol in it. It is okay to take Tylenol with Oxycontin/Oxycodone. Unless you are allergic to aspirin or currently taking a blood thinner, Dr. Beti Gutierrez patients are requested to take one 325 mg aspirin every 12 hours until you are back to walking normally after surgery (This can be up to 6 weeks). Ecotrin (Enteric-coated aspirin) is more sensitive to the stomach and we recommend purchasing this instead of regular aspirin to minimize the risk of stomach irritation. Narcotic medications commonly cause nausea. Taking them with food will decrease this side effect.  If you are having extreme nausea, please contact us for an alternative medication or for something that can be taken with this medication to decrease the nausea. Also, narcotic medications frequently cause constipation. An increase of fiber, fruits and vegetables in your diet may alleviate this problem, or if necessary, you may use an over-the-counter medication such as senekot, colace, or Fibercon for constipation problems. You should resume all medications you were taking prior to the surgery unless otherwise specified. If you had fracture surgery, bony surgery like an osteotomy or fusion, or a surgery that requires bone healing, you are advised to take Vitamin D and Calcium to improve healing potential.  Vitamin D3 4000 units/day and Calcium 1200mg/day. These are over the counter medications so please pick them up at the pharmacy when you are picking up your prescriptions. Activity:   Because of your recent foot surgery, your activity level will decrease. You will need to elevate your foot ABOVE the level of your heart for a minimum of four days. The length of time necessary for the swelling to go down, and for your wounds to heal properly depends greatly on your efforts here. Elevation is extremely important to avoid compromising the blood supply to your foot. Remember when your foot is down it will swell, which will increase pain and slow healing. Wiggle your toes frequently if possible. If you go home with a regional block, (a type of anesthesia) the foot and leg will be numb. Think of ways to get into your house and around the house until the block wears off. Keep in mind that it may be a legal issue if you drive while in a cast or splint, especially when the splint is on the right foot. You may call the Department of Motor Vehicles to schedule a road test if you have adaptive equipment applied to your car.    The amount of weight you are allowed to bear on your foot will be written on your discharge sheet filled out at the time of surgery. The following is an explanation of the possibilities:     Non-weight bearing: You are to put NO weight whatsoever on your foot. When using crutches or a walker, your foot should not touch the ground, except when you are standing. Then, it may rest on the ground. If you are to be non-weight bearing, and you are not compliant, you could compromise the surgery. Some of our patients have been requesting prescriptions for a roll-a-bout knee scooter. [employer] and other insurances have been denying these claims, and you may either have to rent one or pay out of pocket to purchase one.

## 2023-09-11 NOTE — OP NOTE
OPERATIVE REPORT  PATIENT NAME: Shoshana Andrew    :  1965  MRN: [id number]  Pt Location: [facility] OR ROOM 03    SURGERY DATE: 2023    Surgeon(s) and Role:     * Levie Bumpers, MD - Primary     * Carolina Leon MD - Assisting     * Gino Shankar PA-C - Assisting    Preop Diagnosis:  Displaced fracture of navicular (scaphoid) of right foot, initial encounter for closed fracture [S92.251A]  Closed nondisplaced fracture of body of right calcaneus, initial encounter [S92.014A]    Post-Op Diagnosis Codes:     * Displaced fracture of navicular (scaphoid) of right foot, initial encounter for closed fracture [S92.251A]     * Closed nondisplaced fracture of body of right calcaneus, initial encounter [S92.014A]    Procedure(s):  Right - OPEN REDUCTION W/ INTERNAL FIXATION (ORIF) CALCANEUS. Open reduction and internal fixation Navicular    Specimen(s):  * No specimens in log *    Estimated Blood Loss:   Minimal    Drains:  * No LDAs found *    Anesthesia Type:   Choice    Operative Indications:  Displaced fracture of navicular (scaphoid) of right foot, initial encounter for closed fracture [S92.251A]  Closed nondisplaced fracture of body of right calcaneus, initial encounter [S92.014A]      Operative Findings:  Consistent with diagnosis    Complications:   None    Procedure and Technique:  1. Open reduction internal fixation calcaneus  2. Open reduction internal fixation of navicular  3. Fluoroscopy without benefit of radiologist  4. Placement into short leg nonweightbearing plaster splint    A standard timeout was conducted and the esmarch bandage was used to exsanguinate the limb and the thigh tourniquet was inflated to 275mm pressure. A standard sinus tarsi longitudinal incision was made sharply with a  #15 blade and dissected down to the level of the peroneal tendon sheath. The sheath was opened and tendons explored and found to have no injury.   We immediately encountered the tuberosity piece with posterior facet attached abutting the fibula. After freeing the soft tissue atttachments, we used a lamina  to distract the impacted fragment and this gave us a great visualization of the articular surface of the posterior facet. The posterior facet was in 2 pieces. We began reconstructing the calcaneus by pinning the smaller posterior facet fragment to the constant fragment. When we confirmed their position on [place] Heel view on XRay, we turned our attention to reducing the tuberosity. Using a 5mm Shantz pin, we distracted and returned the tuberosity to its native valgus position and pinned it in place. We confirmed on XRay that the reduction was anatomic and placed two steinmann pins through the tuberosity to maintain the reduction. Now that the tuberosity was in appropriate alignment, we reduced the lateral fragment of the posterior facet and pinned it in place. We confirmed its position on fluoroscopy and placed a lag screw, lagging by technique, to compress this fragment to the intact portion of the posterior facet. We then used an Blokify Lateral calcaneal plate to butress the subchondral surfaces of the anterior, middle and posterior facets as well as to reduce and compress the lateral wall blowout. We placed all locking screws through the plate confirming each on Xray ensuring no medial prominences. Attention was turned to the navicular and the dorsal foot and a linear incision was made just lateral to EHL tendon. Sharp dissection was taken through the skin and bovie electrocautery was used to control hemostasis. Scissor dissection was used for the deep dissection. The EHL and EHB tendons were identified an released from their sheath. Careful dissection was performed to mobilize the neurovascular bundle which was right underneath the EHL tendon. The navicular fracture line was identified and exposed. The fracture was copiously irrigated.  A dental pick was used to free the fracture from any soft callous or scar between the fragments. Next the reduction was again performed using a small point to point clamp under fluoroscopic imaging and it was felt to be good position. We used a point-to point clamp to compress the fracture. We then placed the guidewires for the Per 5-0 cannulated screw perpendicular to the fracture line and down the horizontal axis of the navicular aiming towards the tuberosity. We meticulously checked the position of the wire on Fluoroscopy and when we were satisfied, we measured the screw, drilled and placed the appropriate sized headed compression screw over the wire. We once again confimed the screw depth prior to removing the guidewire. We then drilled and placed a solid 3.5mm screw with a washer over the dorsal lateral blowout piece to better control this dorsal fragment. Fluoroscopy was used to confirm alignment. Dr. Zhang Tadeo independently reviewed and assessed all fluoroscopic images. The wound was thoroughly irrigated, vancomycin powder was sprinkled throughout and the wound sequentially closed in layers. The first layer was with 2-0 vicryl to approximate the peroneal tendon sheath as well as repair the extensor digitorum brevis which we elevated during the exposure. Next 4-0 vicryl was used to approximate the subcutaneous tissue and 4-0 nylon used to approximate the skin in an interrupted vertical mattress fashion. A well padded, plaster splint was placed with the ankle and hindfoot held in neutral.  Capillary refill was brisk at the end of the case. I was present for the entire procedure.     Patient Disposition:  PACU         SIGNATURE: Viky Murillo MD  DATE: September 11, 2023  TIME: 12:12 PM

## 2023-09-11 NOTE — INTERVAL H&P NOTE
H&P reviewed. After examining the patient I find no changes in the patients condition since the H&P had been written. There were no vitals filed for this visit. Plan for ORIF right calcaneus and navicular.

## 2023-09-11 NOTE — ANESTHESIA POSTPROCEDURE EVALUATION
Post-Op Assessment Note    CV Status:  Stable  Pain Score: 0    Pain management: adequate     Mental Status:  Alert and awake   Hydration Status:  Euvolemic   PONV Controlled:  Controlled   Airway Patency:  Patent      Post Op Vitals Reviewed: Yes      Staff: CRNA         No notable events documented.     BP   141/70   Temp   97.9   Pulse   82   Resp   15   SpO2   99%

## 2023-09-11 NOTE — ANESTHESIA PREPROCEDURE EVALUATION
Procedure:  OPEN REDUCTION W/ INTERNAL FIXATION (ORIF) CALCANEUS, Open reduction and internal fixation Navicular (Right: Foot)    Relevant Problems   CARDIO   (+) Mixed hyperlipidemia      /RENAL   (+) Nephrolithiasis      MUSCULOSKELETAL   (+) Arthritis of both knees   (+) Chronic bilateral low back pain without sciatica      NEURO/PSYCH   (+) Chronic bilateral low back pain without sciatica   (+) Chronic pain of both shoulders   (+) Chronic pain syndrome        Physical Exam    Airway    Mallampati score: I  TM Distance: >3 FB  Neck ROM: full     Dental       Cardiovascular  Cardiovascular exam normal    Pulmonary  Pulmonary exam normal     Other Findings        Anesthesia Plan  ASA Score- 2     Anesthesia Type- general with ASA Monitors. Additional Monitors:   Airway Plan: LMA. Comment: W/ block. Plan Factors-Exercise tolerance (METS): >4 METS. Chart reviewed. EKG reviewed. Imaging results reviewed. Existing labs reviewed. Patient summary reviewed. Patient is not a current smoker. Patient did not smoke on day of surgery. Obstructive sleep apnea risk education given perioperatively. Induction- intravenous. Postoperative Plan- Plan for postoperative opioid use. Planned trial extubation    Informed Consent- Anesthetic plan and risks discussed with patient. I personally reviewed this patient with the CRNA. Discussed and agreed on the Anesthesia Plan with the CRNA. Karla Briggs

## 2023-09-11 NOTE — ANESTHESIA PROCEDURE NOTES
Peripheral Block    Patient location during procedure: holding area  Reason for block: at surgeon's request and post-op pain management  Staffing  Performed by: Ricardo Hoffman MD  Authorized by: Ricardo Hoffman MD    Preanesthetic Checklist  Completed: patient identified, IV checked, site marked, risks and benefits discussed, surgical consent, monitors and equipment checked, pre-op evaluation and timeout performed  Peripheral Block  Prep: ChloraPrep  Patient monitoring: continuous pulse ox and frequent blood pressure checks  Block type: adductor canal block  Laterality: right  Injection technique: single-shot  Procedures: ultrasound guided, Ultrasound guidance required for the procedure to increase accuracy and safety of medication placement and decrease risk of complications.   Ultrasound permanent image savedbupivacaine (PF) (MARCAINE) 0.5 % injection 20 mL - Perineural   10 mL - 9/11/2023 8:41:00 AM  Assessment  Injection assessment: incremental injection and local visualized surrounding nerve on ultrasound  Paresthesia pain: none  patient tolerated the procedure well with no immediate complications

## 2023-09-11 NOTE — ANESTHESIA PROCEDURE NOTES
Peripheral Block    Patient location during procedure: holding area  Reason for block: at surgeon's request and post-op pain management  Staffing  Performed by: Rach Thornton MD  Authorized by: Rach Thornton MD    Preanesthetic Checklist  Completed: patient identified, IV checked, site marked, risks and benefits discussed, surgical consent, monitors and equipment checked, pre-op evaluation and timeout performed  Peripheral Block  Patient position: supine  Prep: ChloraPrep  Patient monitoring: continuous pulse ox and frequent blood pressure checks  Block type: Popliteal  Laterality: left  Injection technique: single-shot  Procedures: ultrasound guided, Ultrasound guidance required for the procedure to increase accuracy and safety of medication placement and decrease risk of complications.   Ultrasound permanent image savedbupivacaine (PF) (MARCAINE) 0.5 % injection 20 mL - Perineural   15 mL - 9/11/2023 8:41:00 AM  Assessment  Injection assessment: incremental injection and local visualized surrounding nerve on ultrasound  Paresthesia pain: none  patient tolerated the procedure well with no immediate complications

## 2023-09-22 ENCOUNTER — HOSPITAL ENCOUNTER (OUTPATIENT)
Dept: CT IMAGING | Facility: CLINIC | Age: 58
Discharge: HOME | End: 2023-09-22
Payer: COMMERCIAL

## 2023-09-22 DIAGNOSIS — R31.29 MICROHEMATURIA: ICD-10-CM

## 2023-09-22 PROCEDURE — G1004 CDSM NDSC: HCPCS

## 2023-09-22 PROCEDURE — 74178 CT ABD&PLV WO CNTR FLWD CNTR: CPT

## 2023-09-22 RX ADMIN — IOHEXOL 100 ML: 350 INJECTION, SOLUTION INTRAVENOUS at 12:48

## 2023-09-23 ENCOUNTER — OFFICE VISIT (OUTPATIENT)
Dept: URGENT CARE | Facility: CLINIC | Age: 58
End: 2023-09-23
Payer: COMMERCIAL

## 2023-09-23 ENCOUNTER — APPOINTMENT (EMERGENCY)
Dept: RADIOLOGY | Facility: HOSPITAL | Age: 58
End: 2023-09-23
Payer: COMMERCIAL

## 2023-09-23 ENCOUNTER — HOSPITAL ENCOUNTER (EMERGENCY)
Facility: HOSPITAL | Age: 58
Discharge: HOME | End: 2023-09-23
Attending: EMERGENCY MEDICINE | Admitting: EMERGENCY MEDICINE
Payer: COMMERCIAL

## 2023-09-23 VITALS
RESPIRATION RATE: 18 BRPM | SYSTOLIC BLOOD PRESSURE: 120 MMHG | OXYGEN SATURATION: 98 % | HEART RATE: 63 BPM | TEMPERATURE: 97.8 F | DIASTOLIC BLOOD PRESSURE: 77 MMHG

## 2023-09-23 VITALS
OXYGEN SATURATION: 98 % | DIASTOLIC BLOOD PRESSURE: 89 MMHG | TEMPERATURE: 98.4 F | RESPIRATION RATE: 20 BRPM | HEART RATE: 68 BPM | SYSTOLIC BLOOD PRESSURE: 154 MMHG

## 2023-09-23 DIAGNOSIS — L03.90 CELLULITIS: Primary | ICD-10-CM

## 2023-09-23 DIAGNOSIS — Z51.89 VISIT FOR WOUND CHECK: Primary | ICD-10-CM

## 2023-09-23 PROCEDURE — 73564 X-RAY EXAM KNEE 4 OR MORE: CPT

## 2023-09-23 PROCEDURE — 99213 OFFICE O/P EST LOW 20 MIN: CPT

## 2023-09-23 PROCEDURE — 99285 EMERGENCY DEPT VISIT HI MDM: CPT | Performed by: PHYSICIAN ASSISTANT

## 2023-09-23 PROCEDURE — 99283 EMERGENCY DEPT VISIT LOW MDM: CPT

## 2023-09-23 RX ORDER — CEPHALEXIN 250 MG/1
500 CAPSULE ORAL ONCE
Status: COMPLETED | OUTPATIENT
Start: 2023-09-23 | End: 2023-09-23

## 2023-09-23 RX ORDER — CEPHALEXIN 500 MG/1
500 CAPSULE ORAL 4 TIMES DAILY
Qty: 28 CAPSULE | Refills: 0 | Status: SHIPPED | OUTPATIENT
Start: 2023-09-23 | End: 2023-09-30

## 2023-09-23 RX ADMIN — CEPHALEXIN 500 MG: 250 CAPSULE ORAL at 18:33

## 2023-09-23 NOTE — PATIENT INSTRUCTIONS
Call your surgeon. Do not remove the stiches yourself.   Do not self care for your surgery wound - follow your doctors orders

## 2023-09-23 NOTE — ED PROVIDER NOTES
History  Chief Complaint   Patient presents with   • Knee Pain     Pt states there was a foreign object in his skin around his knee and he was pulling on it, states its now looking infected. 71-year-old male patient here for evaluation of swelling of the right knee. He had swelling for about the past 4 to 5 days. He states he thinks he knelt down on a rock for 5 days ago and then got it out however now down again today and since then has had increasing redness and swelling with discharge from the knee. Denies any joint pain. No difficulty ranging the knee. No numbness tingling or weakness. No fevers or chills. States he has been using a pair of tweezers that he ran under hot water to try and did around and look for foreign body. History provided by:  Patient   used: No    Knee Pain  Associated symptoms: no back pain and no fever        Prior to Admission Medications   Prescriptions Last Dose Informant Patient Reported? Taking?    Boswellia-Glucosamine-Vit D (OSTEO BI-FLEX ONE PER DAY PO)   Yes No   Sig: Take by mouth   Patient not taking: Reported on 9/23/2023   DULoxetine (CYMBALTA) 20 mg capsule  Self No No   Sig: Take 1 capsule (20 mg total) by mouth daily   Patient taking differently: Take 20 mg by mouth daily At Lunch with food   Misc Natural Products (Turmeric Curcumin) CAPS  Self Yes No   Sig: Take 1 tablet by mouth daily   Multiple Vitamins-Minerals (Mens Multi Vitamin & Mineral) TABS  Self Yes No   Sig: Take by mouth daily   acetaminophen (TYLENOL) 500 mg tablet   Yes No   Sig: Take 500 mg by mouth every 6 (six) hours as needed for mild pain   aspirin (ECOTRIN) 325 mg EC tablet   No No   Sig: Take 1 tablet (325 mg total) by mouth 2 (two) times a day   atorvastatin (LIPITOR) 10 mg tablet  Self No No   Sig: Take 1 tablet (10 mg total) by mouth daily   Patient taking differently: Take 10 mg by mouth daily At Lunch with food   omega-3-acid ethyl esters (LOVAZA) 1 g capsule Self No No   Sig: Take 2 capsules (2 g total) by mouth 2 (two) times a day   ondansetron (ZOFRAN) 4 mg tablet   No No   Sig: Take 1 tablet (4 mg total) by mouth every 8 (eight) hours as needed for nausea or vomiting   Patient not taking: Reported on 9/23/2023   oxyCODONE (Roxicodone) 5 immediate release tablet   No No   Sig: Take 1 tablet (5 mg total) by mouth every 4 (four) hours as needed for severe pain for up to 30 doses Max Daily Amount: 30 mg      Facility-Administered Medications: None       Past Medical History:   Diagnosis Date   • Arthritis     degenerative   • Chipped tooth    • Chronic kidney disease     ruptured left kidney   • Chronic pain disorder     general body pain   • Dental crown present    • History of fractured rib     "2 stents in Aorta to repair the partial tear" approx 4 yrs ago   • Hyperlipidemia    • Kidney stone    • Teeth missing    • Wears glasses        Past Surgical History:   Procedure Laterality Date   • ANKLE FRACTURE SURGERY Left    • FINGER AMPUTATION Left     4th   • FRACTURE SURGERY Left     femur-with amanda implanted   • KNEE ARTHROSCOPY Bilateral    • KNEE ARTHROSCOPY W/ ACL RECONSTRUCTION     • NASAL SEPTUM SURGERY Bilateral    • OTHER SURGICAL HISTORY      per pt "tear in aorta from rib fracture with 2 stents inserted approx 4 years ago @ [facility]"   • TX CYSTO/URETERO W/LITHOTRIPSY &INDWELL STENT INSRT Left 04/05/2018    Procedure: CYSTOSCOPY URETEROSCOPY WITH LITHOTRIPSY HOLMIUM LASER, RETROGRADE PYELOGRAM AND INSERTION STENT URETERAL;  Surgeon: Jeimy Stringer MD;  Location: [facility];  Service: Urology   • TX OPEN TREATMENT CALCANEAL FRACTURE Right 9/11/2023    Procedure: OPEN REDUCTION W/ INTERNAL FIXATION (ORIF) CALCANEUS, Open reduction and internal fixation Navicular;  Surgeon: Guillermo Mosher MD;  Location:  MAIN OR;  Service: Orthopedics   • WISDOM TOOTH EXTRACTION      and root canals   • WRIST FRACTURE SURGERY Left     plates and screws implanted       Family History   Problem Relation Age of Onset   • Diabetes Father    • Diabetes Mother    • No Known Problems Brother    • No Known Problems Brother    • Heart disease Brother         Pacemaker   • No Known Problems Daughter    • No Known Problems Daughter    • No Known Problems Daughter      I have reviewed and agree with the history as documented. E-Cigarette/Vaping   • E-Cigarette Use Never User      E-Cigarette/Vaping Substances   • Nicotine No    • THC No    • CBD No    • Flavoring No    • Other No    • Unknown No      Social History     Tobacco Use   • Smoking status: Never     Passive exposure: Past   • Smokeless tobacco: Never   Vaping Use   • Vaping Use: Never used   Substance Use Topics   • Alcohol use: No   • Drug use: No       Review of Systems   Constitutional: Negative for chills and fever. HENT: Negative for ear pain and sore throat. Eyes: Negative for pain and visual disturbance. Respiratory: Negative for cough and shortness of breath. Cardiovascular: Negative for chest pain and palpitations. Gastrointestinal: Negative for abdominal pain and vomiting. Genitourinary: Negative for dysuria and hematuria. Musculoskeletal: Negative for arthralgias and back pain. Skin: Positive for rash. Negative for color change. Neurological: Negative for seizures and syncope. All other systems reviewed and are negative. Physical Exam  Physical Exam  Vitals and nursing note reviewed. Constitutional:       General: He is not in acute distress. Appearance: He is well-developed. HENT:      Head: Normocephalic and atraumatic. Eyes:      Conjunctiva/sclera: Conjunctivae normal.   Cardiovascular:      Rate and Rhythm: Normal rate and regular rhythm. Heart sounds: No murmur heard. Pulmonary:      Effort: Pulmonary effort is normal. No respiratory distress. Breath sounds: Normal breath sounds. Abdominal:      Palpations: Abdomen is soft. Tenderness:  There is no abdominal tenderness. Musculoskeletal:         General: No swelling. Cervical back: Neck supple. Skin:     General: Skin is warm and dry. Capillary Refill: Capillary refill takes less than 2 seconds. Neurological:      Mental Status: He is alert. Psychiatric:         Mood and Affect: Mood normal.         Vital Signs  ED Triage Vitals [09/23/23 1812]   Temperature Pulse Respirations Blood Pressure SpO2   98.4 °F (36.9 °C) 68 20 154/89 98 %      Temp Source Heart Rate Source Patient Position - Orthostatic VS BP Location FiO2 (%)   Temporal Monitor Sitting Right arm --      Pain Score       --           Vitals:    09/23/23 1812   BP: 154/89   Pulse: 68   Patient Position - Orthostatic VS: Sitting         Visual Acuity      ED Medications  Medications   cephalexin (KEFLEX) capsule 500 mg (500 mg Oral Given 9/23/23 1833)       Diagnostic Studies  Results Reviewed     None                 XR knee 4+ vw right injury    (Results Pending)              Procedures  Procedures         ED Course                                             Medical Decision Making  Differential diagnosis includes was not limited to cellulitis, abscess, foreign body. Plan: X-ray. Antibiotics. Dispo pending. MDM: 50-year-old male with rash over the leg. Consistent with cellulitis. No foreign object visualized or palpated. None seen on x-ray. Start antibiotics, warm soaks. Follow-up outpatient. Return parameters provided. Patient understands and agrees with plan. Amount and/or Complexity of Data Reviewed  Radiology: ordered. Risk  Prescription drug management.           Disposition  Final diagnoses:   Cellulitis - RLE     Time reflects when diagnosis was documented in both MDM as applicable and the Disposition within this note     Time User Action Codes Description Comment    9/23/2023  6:37 PM Fran Goldstein Add [L03.90] Cellulitis     9/23/2023  6:37 PM Lolita MORRISSEY Modify [W37.65] Cellulitis RLE      ED Disposition     ED Disposition   Discharge    Condition   Stable    Date/Time   Sat Sep 23, 2023  6:37 PM    Comment   Marlyn Mendez discharge to home/self care. Follow-up Information     Follow up With Specialties Details Why Contact Info    Heide Hodgson PA-C Family Medicine, Physician Assistant   [address] [place] [address]  [phone]            Patient's Medications   Discharge Prescriptions    CEPHALEXIN (KEFLEX) 500 MG CAPSULE    Take 1 capsule (500 mg total) by mouth 4 (four) times a day for 7 days       Start Date: 9/23/2023 End Date: 9/30/2023       Order Dose: 500 mg       Quantity: 28 capsule    Refills: 0       No discharge procedures on file.     PDMP Review     None          ED Provider  Electronically Signed by           Jayme Berger PA-C  09/23/23 5948

## 2023-09-23 NOTE — PROGRESS NOTES
[facility]    NAME: Samara Rubinstein is a 63 y.o. male  : 1965    MRN: [id number]  DATE: 2023  TIME: 12:21 PM    Assessment and Plan   Visit for wound check [Z51.89]  1. Visit for wound check          Educated pt extensively over the need to call his surgeon. That is his boot got wet he should contact the surgeon to get a new one placed. Given hygiene method described with concern for potential infection and with boot already off I did inspect the sutures - no signs of infection. I rewrapped with clean ABD and ACE wrap and CAM boot. Pt does have ill fitting crutches. Follow up with primary care in 3-5 days. Go to ER if symptoms get worse. Patient Instructions       Call your surgeon. Do not remove the stiches yourself. Do not self care for your surgery wound - follow your doctors orders      Chief Complaint     Chief Complaint   Patient presents with   • Suture assessment     Verbalizes has sx  and wants sitiches to be looked at as he can not reach them to remove them himself. States "hes  a fast healer"  Also indicates he had a cast that got wet and he removed it him self and did not notify surgeon. History of Present Illness       Presents with concerns from s/p ORIF surgery about sutures. Per report he is supposed to be in a cast and return on 10/4 to see surgeon. Pt reports early on he got his cast wet and removed it. He has been in a CAM walking boot, and reports walking on the boot, working landscaping/law mowing/jennifer. He has been reusing the same ABD gauze which he rinses and cleanses at night and reusing ACE wrap which is in dirt and grass from lawnmoving. He reports cleaning suture sites with chlorehexidine and covering in neosporin. Denies fever or chills. Review of Systems   Review of Systems   Constitutional: Negative for fever. HENT: Negative for congestion. Respiratory: Negative for cough and shortness of breath.     Cardiovascular: Negative for chest pain. Musculoskeletal: Positive for gait problem. Skin: Positive for wound. Hematological: Does not bruise/bleed easily. Psychiatric/Behavioral: Negative for confusion. Current Medications       Current Outpatient Medications:   •  acetaminophen (TYLENOL) 500 mg tablet, Take 500 mg by mouth every 6 (six) hours as needed for mild pain, Disp: , Rfl:   •  aspirin (ECOTRIN) 325 mg EC tablet, Take 1 tablet (325 mg total) by mouth 2 (two) times a day, Disp: 84 tablet, Rfl: 0  •  atorvastatin (LIPITOR) 10 mg tablet, Take 1 tablet (10 mg total) by mouth daily (Patient taking differently: Take 10 mg by mouth daily At Lunch with food), Disp: 90 tablet, Rfl: 0  •  DULoxetine (CYMBALTA) 20 mg capsule, Take 1 capsule (20 mg total) by mouth daily (Patient taking differently: Take 20 mg by mouth daily At Lunch with food), Disp: 90 capsule, Rfl: 1  •  Misc Natural Products (Turmeric Curcumin) CAPS, Take 1 tablet by mouth daily, Disp: , Rfl:   •  Multiple Vitamins-Minerals (Mens Multi Vitamin & Mineral) TABS, Take by mouth daily, Disp: , Rfl:   •  omega-3-acid ethyl esters (LOVAZA) 1 g capsule, Take 2 capsules (2 g total) by mouth 2 (two) times a day, Disp: 360 capsule, Rfl: 0  •  oxyCODONE (Roxicodone) 5 immediate release tablet, Take 1 tablet (5 mg total) by mouth every 4 (four) hours as needed for severe pain for up to 30 doses Max Daily Amount: 30 mg, Disp: 30 tablet, Rfl: 0  •  Boswellia-Glucosamine-Vit D (OSTEO BI-FLEX ONE PER DAY PO), Take by mouth (Patient not taking: Reported on 9/23/2023), Disp: , Rfl:   •  ondansetron (ZOFRAN) 4 mg tablet, Take 1 tablet (4 mg total) by mouth every 8 (eight) hours as needed for nausea or vomiting (Patient not taking: Reported on 9/23/2023), Disp: 10 tablet, Rfl: 0  No current facility-administered medications for this visit.     Current Allergies     Allergies as of 09/23/2023   • (No Known Allergies)            The following portions of the patient's history were reviewed and updated as appropriate: allergies, current medications, past family history, past medical history, past social history, past surgical history and problem list.     Past Medical History:   Diagnosis Date   • Arthritis     degenerative   • Chipped tooth    • Chronic kidney disease     ruptured left kidney   • Chronic pain disorder     general body pain   • Dental crown present    • History of fractured rib     "2 stents in Aorta to repair the partial tear" approx 4 yrs ago   • Hyperlipidemia    • Kidney stone    • Teeth missing    • Wears glasses        Past Surgical History:   Procedure Laterality Date   • ANKLE FRACTURE SURGERY Left    • FINGER AMPUTATION Left     4th   • FRACTURE SURGERY Left     femur-with amanda implanted   • KNEE ARTHROSCOPY Bilateral    • KNEE ARTHROSCOPY W/ ACL RECONSTRUCTION     • NASAL SEPTUM SURGERY Bilateral    • OTHER SURGICAL HISTORY      per pt "tear in aorta from rib fracture with 2 stents inserted approx 4 years ago @ [facility]"   • SC CYSTO/URETERO W/LITHOTRIPSY &INDWELL STENT INSRT Left 04/05/2018    Procedure: CYSTOSCOPY URETEROSCOPY WITH LITHOTRIPSY HOLMIUM LASER, RETROGRADE PYELOGRAM AND INSERTION STENT URETERAL;  Surgeon: Lolita Villanueva MD;  Location: [facility];  Service: Urology   • SC OPEN TREATMENT CALCANEAL FRACTURE Right 9/11/2023    Procedure: OPEN REDUCTION W/ INTERNAL FIXATION (ORIF) CALCANEUS, Open reduction and internal fixation Navicular;  Surgeon: Sanjuana Govea MD;  Location:  MAIN OR;  Service: Orthopedics   • WISDOM TOOTH EXTRACTION      and root canals   • WRIST FRACTURE SURGERY Left     plates and screws implanted       Family History   Problem Relation Age of Onset   • Diabetes Father    • Diabetes Mother    • No Known Problems Brother    • No Known Problems Brother    • Heart disease Brother         Pacemaker   • No Known Problems Daughter    • No Known Problems Daughter    • No Known Problems Daughter Medications have been verified. Objective   /77   Pulse 63   Temp 97.8 °F (36.6 °C)   Resp 18   SpO2 98%        Physical Exam     Physical Exam  Vitals reviewed. Constitutional:       Appearance: Normal appearance. Cardiovascular:      Rate and Rhythm: Normal rate and regular rhythm. Pulses: Normal pulses. Heart sounds: Normal heart sounds. No murmur heard. Pulmonary:      Effort: Pulmonary effort is normal. No respiratory distress. Breath sounds: Normal breath sounds. Skin:     General: Skin is warm and dry. Capillary Refill: Capillary refill takes less than 2 seconds. Comments: Right foot/heel with well healing sutures intact, no erythema, warmth or drainage from the site. Neurological:      General: No focal deficit present. Mental Status: He is alert and oriented to person, place, and time.    Psychiatric:         Mood and Affect: Mood normal.         Behavior: Behavior normal.

## 2023-10-03 ENCOUNTER — OFFICE VISIT (OUTPATIENT)
Dept: OBGYN CLINIC | Facility: CLINIC | Age: 58
End: 2023-10-03

## 2023-10-03 ENCOUNTER — APPOINTMENT (OUTPATIENT)
Dept: RADIOLOGY | Facility: AMBULARY SURGERY CENTER | Age: 58
End: 2023-10-03
Payer: COMMERCIAL

## 2023-10-03 VITALS — BODY MASS INDEX: 24.69 KG/M2 | HEIGHT: 71 IN | WEIGHT: 176.37 LBS

## 2023-10-03 DIAGNOSIS — S92.001D CLOSED DISPLACED FRACTURE OF RIGHT CALCANEUS WITH ROUTINE HEALING, UNSPECIFIED PORTION OF CALCANEUS, SUBSEQUENT ENCOUNTER: Primary | ICD-10-CM

## 2023-10-03 DIAGNOSIS — S92.001D CLOSED DISPLACED FRACTURE OF RIGHT CALCANEUS WITH ROUTINE HEALING, UNSPECIFIED PORTION OF CALCANEUS, SUBSEQUENT ENCOUNTER: ICD-10-CM

## 2023-10-03 PROCEDURE — 99024 POSTOP FOLLOW-UP VISIT: CPT | Performed by: ORTHOPAEDIC SURGERY

## 2023-10-03 PROCEDURE — 73620 X-RAY EXAM OF FOOT: CPT

## 2023-10-03 PROCEDURE — 73630 X-RAY EXAM OF FOOT: CPT

## 2023-10-03 NOTE — PROGRESS NOTES
Sanjuana Govea M.D. Attending, Orthopaedic Surgery  Foot and Ankle  [facility]      ORTHOPAEDIC FOOT AND ANKLE POST-OP VISIT     Procedure:     ORIF right calcaneus and navicular        Date of surgery:   9/11/23    He arrives today in a sneaker and partially weightbearing. He removed his surgical splint and dressing 10 days after the surgery and returned to work. His wounds have serous drainage, his ankle and foot and very swollen. We obtained Xrays to evaluate how this looks given his noncompliance over the first 3 weeks postop. Miraculously, everything looks as we left it in the OR. We emplored him to follow these restrictions to ensure he gets the best outcome possible. A navicular fracture or calcaneus fracture by themselves respectively, can lead to poor outcomes but these two together, like he has, further places importance on followup postop instructions      PLAN  1. Weightbearing Status- NWB operative extremity in [address]  2. DVT prophylaxis-  mg BID  3. Continue to elevate 23hrs/day getting up 1x per hour to prevent a blood clot  4. Pain control- OTC pain medication  5. RTC in 3 week(s)  6. Xrays needed next visit - yes nonweightbearing 3 view foot with os calcis(rowe heel view)    History of Present Illness:   Chief Complaint:   Chief Complaint   Patient presents with   • Right Foot - Post-op     Josette Sheets is a 63 y.o. male who is being seen for post-operative visit for the above procedure. Pain is well controlled and the patient has successfully transitioned to OTC pain medicines. he is taking ASA 325mg BID for DVT prophylaxis. Patient was instructed to be NWB in his splint, however his splint got wet on POD 10 and he removed it. He has been bearing weight on his right lower extremity since POD 10 in a sneaker and has been working full time doing landscaping and carpentry. He removed several of his sutures.  He developed right anterior knee cellulitis with a wound and was placed on a 7 days course of PO antibiotics which he has completed. He denies numbness or paresthesias. Review of Systems:  General- denies fever/chills  Respiratory- denies cough or SOB  Cardio- denies chest pain or palpitations  GI- denies abdominal pain  Musculoskeletal- Negative except noted above  Skin- denies rashes or wounds    Physical Exam:   Ht 5' 11" (1.803 m)   Wt 80 kg (176 lb 5.9 oz)   BMI 24.60 kg/m²   General/Constitutional: No apparent distress: well-nourished and well developed. Eyes: normal ocular motion  Lymphatic: No appreciable lymphadenopathy  Respiratory: Non-labored breathing  Vascular: No edema, swelling or tenderness, except as noted in detailed exam.  Integumentary: No impressive skin lesions present, except as noted in detailed exam.  Neuro: No ataxia or tremors noted  Psych: Normal mood and affect, oriented to person, place and time. Appropriate affect. Musculoskeletal: Normal, except as noted in detailed exam and in HPI. Examination    Right        Incision Serous drainage from lateral incision  Sutures removed this visit    Ecchymosis None    Swelling Mild-moderate    Sensation Intact to light touch throughout sural, saphenous, superficial peroneal, deep peroneal and medial/lateral plantar nerve distributions. [place] 5.07 filament (10g) testing deferred. Cardiovascular Brisk capillary refill < 2 seconds,intact DP and PT pulses    Special Tests None      Imaging Studies:   X-Rays 3 views right foot demonstrate stable alignment with hardware in expected position. Reviewed by me personally. Magnolia Bi. Lachman, MD  Foot & Ankle Surgery   Department of [address] [place] [address] personally performed the service. Magnolia Bi. Lachman, MD

## 2023-10-03 NOTE — PATIENT INSTRUCTIONS
Nonweightbearing     Continue aspirin/lovenox to prevent blood clots  Purchase a compression stocking (Knee high, 20-30mm Hg) to be worn at all times while awake for your next visit when the cast comes off. Recommend taking the following supplements: Vitamin D 4000 units per day and Calcium 1200 mg per day. This will help with bone healing. Care of Casts and Splints    Casts and splints support and protect injured bones and soft tissue. When you break a bone, your doctor will put the pieces back together in the right position. Casts and splints hold the bones in place while they heal. They also reduce pain, swelling, and muscle spasm. In some cases, splints and casts are applied following surgery. Splints or "half-casts" provide less support than casts. However, splints can be adjusted to accommodate swelling from injuries easier than enclosed casts. Your doctor will decide which type of support is best for you. Types of Splints and Casts  Casts are custom-made. They must fit the shape of your injured limb correctly to provide the best support. Casts can be made of plaster or fiberglass -- a plastic that can be shaped. Splints or half-casts can also be custom-made, especially if an exact fit is necessary. Other times, a ready-made splint will be used. These off-the-shelf splints are made in a variety of shapes and sizes, and are much easier and faster to use. They have Velcro straps which make the splints easy to put on, take off, and adjust.    Materials  Fiberglass or plaster materials form the hard supportive layer in splints and casts. Fiberglass is lighter in weight and stronger than plaster. In addition, x-rays can "see through" fiberglass better than through plaster. This is important because your doctor will probably schedule additional x-rays after your splint or cast has been applied. X-rays can show whether the bones are healing well or have moved out of place.   Plaster is less expensive than fiberglass and shapes better than fiberglass for some uses. Application  Both fiberglass and plaster splints and casts use padding, usually cotton, as a protective layer next to the skin. Both materials come in strips or rolls which are dipped in water and applied over the padding covering the injured area. The splint or cast must fit the shape of the injured arm or leg correctly to provide the best possible support. Generally, the splint or cast also covers the joint above and below the broken bone. In many cases, a splint is applied to a fresh injury first. As swelling subsides, a full cast may replace the splint. Sometimes, it may be necessary to replace a cast as swelling goes down and the cast gets "too big." As a fracture heals, the cast may be replaced by a splint to make it easier to perform physical therapy exercises. Apply ice to the splint or cast and elevate your leg to reduce swelling. Warning Signs  Swelling can create a lot of pressure under your cast. This can lead to problems. If you experience any of the following symptoms, contact your doctor's office immediately for advice. Increased pain and the feeling that the splint or cast is too tight. This may be caused by swelling. Numbness and tingling in your hand or foot. This may be caused by too much pressure on the nerves. Burning and stinging. This may be caused by too much pressure on the skin. Excessive swelling below the cast. This may mean the cast is slowing your blood circulation. Loss of active movement of toes or fingers. This requires an urgent evaluation by your doctor. Getting Used to a Splint or Cast  Swelling due to your injury may cause pressure in your splint or cast for the first 48 to 72 hours. This may cause your injured arm or leg to feel snug or tight in the splint or cast. If you have a splint, your doctor will show you how to adjust it to accommodate the swelling.   It is very important to keep the swelling down. This will lessen pain and help your injury heal. To help reduce swelling:  Elevate. It is very important to elevate your injured arm or leg for the first 24 to 72 hours. Prop your injured arm or leg up above your heart by putting it on pillows or some other support. You will have to recline if the splint or cast is on your leg. Elevation allows clear fluid and blood to drain "downhill" to your heart. Exercise. Move your uninjured, but swollen fingers or toes gently and often. Moving them often will prevent stiffness. Ice. Apply ice to the splint or cast. Place the ice in a dry plastic bag or ice pack and loosely wrap it around the splint or cast at the level of the injury. Ice that is packed in a rigid container and touches the cast at only one point will not be effective. Taking Care of Your Splint or Cast  Your doctor will explain any restrictions on using your injured arm or leg while it is healing. You must follow your doctor's instructions carefully to make sure your bone heals properly. The following information provides general guidelines only, and is not a substitute for your doctor's advice. After you have adjusted to your splint or cast for a few days, it is important to keep it in good condition. This will help your recovery. Keep your splint or cast dry. Moisture weakens plaster and damp padding next to the skin can cause irritation. Use two layers of plastic or purchase waterproof shields to keep your splint or cast dry while you shower or bathe. Even if the cast is covered, do not submerge it or hold it under running water. A small pinhole in the cast cover can cause the injury to get soaked. Walking casts. Do not walk on a "walking cast" until it is completely dry and hard. It takes about one hour for fiberglass, and two to three days for plaster to become hard enough to walk on. Avoid dirt.  Keep dirt, sand, and powder away from the inside of your splint or cast. Padding. Do not pull out the padding from your splint or cast.   Itching. Do not stick objects such as coat hangers inside the splint or cast to scratch itching skin. Do not apply powders or deodorants to itching skin. If itching persists, contact your doctor. Trimming. Do not break off rough edges of the cast or trim the cast before asking your doctor. Skin. Inspect the skin around the cast. If your skin becomes red or raw around the cast, contact your doctor. Inspect the cast regularly. If it becomes cracked or develops soft spots, contact your doctor's office. Use common sense. You have a serious injury and you must protect your cast from damage so it can protect your injury while it heals. After the initial swelling has subsided, proper splint or cast support will usually allow you to continue your daily activities with a minimum of inconvenience. Cast Removal  Never remove the cast yourself. You may cut your skin or prevent proper healing of your injury. Your doctor will use a cast saw to remove your cast. The saw vibrates, but does not rotate. If the blade of the saw touches the padding inside the hard shell of the cast, the padding will vibrate with the blade and will protect your skin. Cast saws make noise and may feel "hot" from friction, but will not harm you -- "their bark is worse than their bite."  If you do feel pain while the cast is being removed, let your doctor or an assistant know and they will be able to make adjustments. The saw vibrates but does not rotate. Cast saws make noise but will not harm you.

## 2023-10-09 ENCOUNTER — PROCEDURE VISIT (OUTPATIENT)
Dept: UROLOGY | Facility: CLINIC | Age: 58
End: 2023-10-09
Payer: COMMERCIAL

## 2023-10-09 VITALS
DIASTOLIC BLOOD PRESSURE: 70 MMHG | OXYGEN SATURATION: 98 % | RESPIRATION RATE: 16 BRPM | HEIGHT: 71 IN | WEIGHT: 181 LBS | BODY MASS INDEX: 25.34 KG/M2 | HEART RATE: 76 BPM | SYSTOLIC BLOOD PRESSURE: 130 MMHG

## 2023-10-09 DIAGNOSIS — Z71.2 ENCOUNTER TO DISCUSS TEST RESULTS: ICD-10-CM

## 2023-10-09 DIAGNOSIS — N20.0 CALCULUS OF KIDNEY: ICD-10-CM

## 2023-10-09 DIAGNOSIS — N40.1 BENIGN PROSTATIC HYPERPLASIA WITH LOWER URINARY TRACT SYMPTOMS, SYMPTOM DETAILS UNSPECIFIED: ICD-10-CM

## 2023-10-09 DIAGNOSIS — R31.29 MICROSCOPIC HEMATURIA: Primary | ICD-10-CM

## 2023-10-09 LAB
AMORPH URATE CRY URNS QL MICRO: ABNORMAL
BACTERIA UR QL AUTO: ABNORMAL /HPF
BILIRUB UR QL STRIP: NEGATIVE
CLARITY UR: ABNORMAL
COLOR UR: ABNORMAL
GLUCOSE UR STRIP-MCNC: NEGATIVE MG/DL
HGB UR QL STRIP.AUTO: ABNORMAL
KETONES UR STRIP-MCNC: NEGATIVE MG/DL
LEUKOCYTE ESTERASE UR QL STRIP: NEGATIVE
MUCOUS THREADS UR QL AUTO: ABNORMAL
NITRITE UR QL STRIP: NEGATIVE
NON-SQ EPI CELLS URNS QL MICRO: ABNORMAL /HPF
PH UR STRIP.AUTO: 7 [PH]
PROT UR STRIP-MCNC: ABNORMAL MG/DL
RBC #/AREA URNS AUTO: ABNORMAL /HPF
SP GR UR STRIP.AUTO: 1.02 (ref 1–1.03)
UROBILINOGEN UR STRIP-ACNC: <2 MG/DL
WBC #/AREA URNS AUTO: ABNORMAL /HPF

## 2023-10-09 PROCEDURE — 81001 URINALYSIS AUTO W/SCOPE: CPT | Performed by: UROLOGY

## 2023-10-09 PROCEDURE — 87086 URINE CULTURE/COLONY COUNT: CPT | Performed by: UROLOGY

## 2023-10-09 PROCEDURE — 99214 OFFICE O/P EST MOD 30 MIN: CPT | Performed by: UROLOGY

## 2023-10-09 PROCEDURE — 88112 CYTOPATH CELL ENHANCE TECH: CPT | Performed by: SPECIALIST

## 2023-10-09 PROCEDURE — 52000 CYSTOURETHROSCOPY: CPT | Performed by: UROLOGY

## 2023-10-09 NOTE — PROGRESS NOTES
Cystoscopy     Date/Time 10/9/2023 9:30 AM     Performed by  Suhail Hogue DO   Authorized by Suhail Hogue DO     Universal Protocol:  Consent: Verbal consent obtained. Written consent obtained. Risks and benefits: risks, benefits and alternatives were discussed  Consent given by: patient  Time out: Immediately prior to procedure a "time out" was called to verify the correct patient, procedure, equipment, support staff and site/side marked as required. Timeout called at: 10/9/2023 9:51 AM.  Patient understanding: patient states understanding of the procedure being performed  Patient consent: the patient's understanding of the procedure matches consent given  Procedure consent: procedure consent matches procedure scheduled  Patient identity confirmed: verbally with patient        Procedure Details:  Procedure type: cystoscopy    Additional Procedure Details: Office Cystoscopy Procedure Note    Indication:    Hematuria    Informed consent   The risks, benefits, complications, treatment options, and expected outcomes were discussed with the patient. The patient concurred with the proposed plan and provided informed consent. Anesthesia  Lidocaine jelly 2%    Antibiotic prophylaxis   None    Procedure  The patient was placed in the supineposition, was prepped and draped in the usual manner using sterile technique, and 2% lidocaine jelly instilled into the urethra. A 17 F flexible cystoscope was then inserted into the urethra and the urethra and bladder carefully examined.   The following findings were noted:    Findings:  Urethra:  Normal  Prostate:  bilateral lateral lobe hypertrophy with kissing lobes, minimal median lobe, no lesions, short prostate  Bladder:  Multiple small diverticuli, mild to moderate trabeculations, no lesions, no stones  Ureteral orifices:  orthotopic  Other findings:  None, retroflexed view confirms    Specimens: UA, urine culture, urine cytology provided before procedure                 Complications:    None; patient tolerated the procedure well           Disposition: To home            Condition: Stable    Plan: See accompanying note
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
## 2023-10-09 NOTE — PATIENT INSTRUCTIONS
You had cystoscopy done in the office today. This means that we looked inside your urethra and bladder with a camera. You may see some blood in your urine for the next few days. This is normal. Please drink plenty of fluids. Call the office if you are passing large blood clots in your urine or if you are not able to urinate. It may burn when you urinate for the next few days. This is normal.    Please call the office if you have fevers or chills in the next few days. You will return to clinic in 1 month.
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
## 2023-10-10 LAB — BACTERIA UR CULT: NORMAL

## 2023-10-10 PROCEDURE — 88112 CYTOPATH CELL ENHANCE TECH: CPT | Performed by: SPECIALIST

## 2023-10-26 DIAGNOSIS — E78.2 MIXED HYPERLIPIDEMIA: ICD-10-CM

## 2023-10-26 RX ORDER — ATORVASTATIN CALCIUM 10 MG/1
10 TABLET, FILM COATED ORAL DAILY
Qty: 90 TABLET | Refills: 0 | Status: SHIPPED | OUTPATIENT
Start: 2023-10-26 | End: 2024-01-24

## 2023-10-31 ENCOUNTER — APPOINTMENT (OUTPATIENT)
Dept: RADIOLOGY | Facility: AMBULARY SURGERY CENTER | Age: 58
End: 2023-10-31
Attending: ORTHOPAEDIC SURGERY
Payer: COMMERCIAL

## 2023-10-31 ENCOUNTER — OFFICE VISIT (OUTPATIENT)
Dept: OBGYN CLINIC | Facility: CLINIC | Age: 58
End: 2023-10-31

## 2023-10-31 VITALS
SYSTOLIC BLOOD PRESSURE: 136 MMHG | DIASTOLIC BLOOD PRESSURE: 89 MMHG | HEART RATE: 63 BPM | HEIGHT: 72 IN | WEIGHT: 178 LBS | BODY MASS INDEX: 24.11 KG/M2

## 2023-10-31 DIAGNOSIS — S92.001D CLOSED DISPLACED FRACTURE OF RIGHT CALCANEUS WITH ROUTINE HEALING, UNSPECIFIED PORTION OF CALCANEUS, SUBSEQUENT ENCOUNTER: ICD-10-CM

## 2023-10-31 DIAGNOSIS — Z01.89 ENCOUNTER FOR LOWER EXTREMITY COMPARISON IMAGING STUDY: ICD-10-CM

## 2023-10-31 DIAGNOSIS — S92.001D CLOSED DISPLACED FRACTURE OF RIGHT CALCANEUS WITH ROUTINE HEALING, UNSPECIFIED PORTION OF CALCANEUS, SUBSEQUENT ENCOUNTER: Primary | ICD-10-CM

## 2023-10-31 PROCEDURE — 73630 X-RAY EXAM OF FOOT: CPT

## 2023-10-31 PROCEDURE — 99024 POSTOP FOLLOW-UP VISIT: CPT | Performed by: ORTHOPAEDIC SURGERY

## 2023-10-31 PROCEDURE — 73620 X-RAY EXAM OF FOOT: CPT

## 2023-10-31 PROCEDURE — 73650 X-RAY EXAM OF HEEL: CPT

## 2023-10-31 NOTE — PROGRESS NOTES
Jerry Adames M.D. Attending, Orthopaedic Surgery  Foot and Ankle  [facility]      ORTHOPAEDIC FOOT AND ANKLE POST-OP VISIT     Procedure:     ORIF Right calcaneus and navicular       Date of surgery:   9/11/23    He cut his cast off and has been weightbearing on it. Stating that he walked in a parade and has been asking to jog as soon as possible. Xrays today continue to look good in both the calcaneus and navicular      PLAN  1. Weightbearing Status - NWB operative extremity  2. DVT prophylaxis - ASA 325mg BID completed  3. Begin PT/HEP as directed  4. Pain control - OTC pain medication  5. RTC in 6 week(s)  6. Xrays needed next visit - Yes weightbearing foot    History of Present Illness:   Chief Complaint:   Chief Complaint   Patient presents with    Follow-up     Patient is doing well he only has pain when he is on his feet for to long he would like to know if the hardware is going to be removed as that's where most of his pain is        Rekelly Chowdary is a 63 y.o. male who is being seen for 6 week post-operative visit for the above procedure. Pain is well controlled and the patient has successfully transitioned to OTC pain medicines. he has completed ASA 325mg BID for DVT prophylaxis. Patient was supposed to have been NWB in a Short leg cast but he cut his cast off and walks in today in a sneaker      Review of Systems:  General- denies fever/chills  Respiratory- denies cough or SOB  Cardio- denies chest pain or palpitations  GI- denies abdominal pain  Musculoskeletal- Negative except noted above  Skin- denies rashes or wounds    Physical Exam:   /89   Pulse 63   Ht 6' (1.829 m)   Wt 80.7 kg (178 lb)   BMI 24.14 kg/m²   General/Constitutional: No apparent distress: well-nourished and well developed.   Eyes: normal ocular motion  Lymphatic: No appreciable lymphadenopathy  Respiratory: Non-labored breathing  Vascular: No edema, swelling or tenderness, except as noted in detailed exam.  Integumentary: No impressive skin lesions present, except as noted in detailed exam.  Neuro: No ataxia or tremors noted  Psych: Normal mood and affect, oriented to person, place and time. Appropriate affect. Musculoskeletal: Normal, except as noted in detailed exam and in HPI. Examination    right        Incision Clean, dry, intact  Sutures Previously removed. Ecchymosis none    Swelling mild    Sensation Intact to light touch throughout sural, saphenous, superficial peroneal, deep peroneal and medial/lateral plantar nerve distributions. [place] 5.07 filament (10g) testing deferred. Cardiovascular Brisk capillary refill < 2 seconds,intact DP and PT pulses    Special Tests None      Imaging Studies:       3 views of the right ankle were taken, reviewed and interpreted independently that demonstrate hardware in expected position without signs of failure or loosening. Reviewed by me personally. Scribe Attestation      I,:   am acting as a scribe while in the presence of the attending physician.:       I,:   personally performed the services described in this documentation    as scribed in my presence.:                Sukhjinder Del Valle. Lachman, MD  Foot & Ankle Surgery   Department of [address] [place] [address]      I personally performed the service. Sukhjinder Del Valle.  Lachman, MD

## 2023-10-31 NOTE — PATIENT INSTRUCTIONS
Continued nonweightbearing for the next 6 weeks  Begin PT immediately. May DC aspirin/lovenox, no longer needed. May shower, do not soak in a tub/pool/ocean/etc for another 4 weeks. Scar massage- pea sized amount of lotion, massage into scar for 5 minutes each day. Compression stocking (Knee high, 20-30mm Hg) to be worn at all times while awake. Recommend taking the following supplements: Vitamin D 4000 units per day and Calcium 1200 mg per day. This will help with bone healing. Return to clinic in 6 weeks for weightbearing Xray at that time.

## 2023-11-14 ENCOUNTER — TELEPHONE (OUTPATIENT)
Dept: UROLOGY | Facility: CLINIC | Age: 58
End: 2023-11-14

## 2023-11-14 NOTE — TELEPHONE ENCOUNTER
Pt was scheduled for 11/14/23 and he was there for about a half hour and was never called into the appointment. He states that the did the kiosk check in. The next appt with Dr. Soraya Acuna is in February. He was wondering if he even needed a f/u since it was about kidney stones. He said he feels better.   Please review and advice     Pt can be reached at [phone]

## 2023-11-17 NOTE — TELEPHONE ENCOUNTER
Left VM for pt to call us back ( provided number) to set up / R/S his appt he missed. I noted in the VM that he can see one of the AP in the office, and that it is recommended he be seen.  He has seen Chrissy Brady in the past .

## 2024-01-26 DIAGNOSIS — E78.2 MIXED HYPERLIPIDEMIA: ICD-10-CM

## 2024-01-26 RX ORDER — ATORVASTATIN CALCIUM 10 MG/1
10 TABLET, FILM COATED ORAL DAILY
Qty: 90 TABLET | Refills: 0 | Status: SHIPPED | OUTPATIENT
Start: 2024-01-26 | End: 2024-04-25

## 2024-01-26 NOTE — TELEPHONE ENCOUNTER
Pt stooped in office to reschedule his appt.  He requests refill (he only has 1 left) pt is coming in Wednesday.    Dharmesh/Btety    (Note: pt cx last appt because of ins issues)

## 2024-01-31 ENCOUNTER — OFFICE VISIT (OUTPATIENT)
Dept: FAMILY MEDICINE CLINIC | Facility: CLINIC | Age: 59
End: 2024-01-31
Payer: COMMERCIAL

## 2024-01-31 VITALS
BODY MASS INDEX: 25.06 KG/M2 | SYSTOLIC BLOOD PRESSURE: 124 MMHG | WEIGHT: 185 LBS | HEIGHT: 72 IN | HEART RATE: 64 BPM | OXYGEN SATURATION: 100 % | TEMPERATURE: 97.1 F | DIASTOLIC BLOOD PRESSURE: 84 MMHG

## 2024-01-31 DIAGNOSIS — R31.29 OTHER MICROSCOPIC HEMATURIA: ICD-10-CM

## 2024-01-31 DIAGNOSIS — Z00.00 HEALTH MAINTENANCE EXAMINATION: Primary | ICD-10-CM

## 2024-01-31 DIAGNOSIS — Z13.1 SCREENING FOR DIABETES MELLITUS: ICD-10-CM

## 2024-01-31 DIAGNOSIS — R09.89 LEFT CAROTID BRUIT: ICD-10-CM

## 2024-01-31 DIAGNOSIS — E78.2 MIXED HYPERLIPIDEMIA: ICD-10-CM

## 2024-01-31 PROCEDURE — 99396 PREV VISIT EST AGE 40-64: CPT | Performed by: PHYSICIAN ASSISTANT
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
## 2024-02-01 ENCOUNTER — APPOINTMENT (OUTPATIENT)
Dept: LAB | Facility: CLINIC | Age: 59
End: 2024-02-01
Payer: COMMERCIAL

## 2024-02-01 DIAGNOSIS — R31.29 MICROHEMATURIA: ICD-10-CM

## 2024-02-01 DIAGNOSIS — Z13.1 SCREENING FOR DIABETES MELLITUS: ICD-10-CM

## 2024-02-01 DIAGNOSIS — E78.2 MIXED HYPERLIPIDEMIA: ICD-10-CM

## 2024-02-01 LAB
ALBUMIN SERPL BCP-MCNC: 4.5 G/DL (ref 3.5–5)
ALP SERPL-CCNC: 85 U/L (ref 34–104)
ALT SERPL W P-5'-P-CCNC: 20 U/L (ref 7–52)
ANION GAP SERPL CALCULATED.3IONS-SCNC: 13 MMOL/L
AST SERPL W P-5'-P-CCNC: 18 U/L (ref 13–39)
BILIRUB SERPL-MCNC: 0.58 MG/DL (ref 0.2–1)
BUN SERPL-MCNC: 19 MG/DL (ref 5–25)
CALCIUM SERPL-MCNC: 10.1 MG/DL (ref 8.4–10.2)
CHLORIDE SERPL-SCNC: 108 MMOL/L (ref 96–108)
CHOLEST SERPL-MCNC: 218 MG/DL
CO2 SERPL-SCNC: 24 MMOL/L (ref 21–32)
CREAT SERPL-MCNC: 0.84 MG/DL (ref 0.6–1.3)
GFR SERPL CREATININE-BSD FRML MDRD: 97 ML/MIN/1.73SQ M
GLUCOSE P FAST SERPL-MCNC: 80 MG/DL (ref 65–99)
HDLC SERPL-MCNC: 59 MG/DL
LDLC SERPL CALC-MCNC: 119 MG/DL (ref 0–100)
LDLC SERPL DIRECT ASSAY-MCNC: 146 MG/DL (ref 0–100)
NONHDLC SERPL-MCNC: 159 MG/DL
POTASSIUM SERPL-SCNC: 4.3 MMOL/L (ref 3.5–5.3)
PROT SERPL-MCNC: 7.6 G/DL (ref 6.4–8.4)
SODIUM SERPL-SCNC: 145 MMOL/L (ref 135–147)
TRIGL SERPL-MCNC: 201 MG/DL

## 2024-02-01 PROCEDURE — 80053 COMPREHEN METABOLIC PANEL: CPT

## 2024-02-01 PROCEDURE — 80061 LIPID PANEL: CPT

## 2024-02-01 PROCEDURE — 36415 COLL VENOUS BLD VENIPUNCTURE: CPT

## 2024-02-01 PROCEDURE — 83721 ASSAY OF BLOOD LIPOPROTEIN: CPT

## 2024-02-07 ENCOUNTER — HOSPITAL ENCOUNTER (OUTPATIENT)
Dept: NON INVASIVE DIAGNOSTICS | Facility: HOSPITAL | Age: 59
Discharge: HOME | End: 2024-02-07
Payer: COMMERCIAL

## 2024-02-07 DIAGNOSIS — R09.89 LEFT CAROTID BRUIT: ICD-10-CM

## 2024-02-07 PROCEDURE — 93880 EXTRACRANIAL BILAT STUDY: CPT

## 2024-02-07 PROCEDURE — 93880 EXTRACRANIAL BILAT STUDY: CPT | Performed by: SURGERY

## 2024-02-28 DIAGNOSIS — G89.4 CHRONIC PAIN SYNDROME: ICD-10-CM

## 2024-02-28 RX ORDER — DULOXETIN HYDROCHLORIDE 20 MG/1
20 CAPSULE, DELAYED RELEASE ORAL DAILY
Qty: 90 CAPSULE | Refills: 1 | Status: SHIPPED | OUTPATIENT
Start: 2024-02-28

## 2024-03-18 ENCOUNTER — APPOINTMENT (OUTPATIENT)
Dept: RADIOLOGY | Facility: CLINIC | Age: 59
End: 2024-03-18
Payer: COMMERCIAL

## 2024-03-18 ENCOUNTER — OFFICE VISIT (OUTPATIENT)
Dept: URGENT CARE | Facility: CLINIC | Age: 59
End: 2024-03-18
Payer: COMMERCIAL

## 2024-03-18 VITALS
SYSTOLIC BLOOD PRESSURE: 142 MMHG | OXYGEN SATURATION: 98 % | HEART RATE: 72 BPM | TEMPERATURE: 97.7 F | DIASTOLIC BLOOD PRESSURE: 83 MMHG | RESPIRATION RATE: 18 BRPM

## 2024-03-18 DIAGNOSIS — M89.8X1 PAIN OF LEFT CLAVICLE: ICD-10-CM

## 2024-03-18 DIAGNOSIS — M89.8X1 PAIN OF LEFT CLAVICLE: Primary | ICD-10-CM

## 2024-03-18 DIAGNOSIS — R07.89 STERNAL PAIN: ICD-10-CM

## 2024-03-18 PROCEDURE — 71046 X-RAY EXAM CHEST 2 VIEWS: CPT

## 2024-03-18 PROCEDURE — 73000 X-RAY EXAM OF COLLAR BONE: CPT

## 2024-03-18 PROCEDURE — 99213 OFFICE O/P EST LOW 20 MIN: CPT | Performed by: PHYSICIAN ASSISTANT

## 2024-03-18 NOTE — PROGRESS NOTES
[facility]    NAME: Fabián Mendez is a 58 y.o. male  : 1965    MRN: [id number]  DATE: 2024  TIME: 9:40 AM    Assessment and Plan   Pain of left clavicle [M89.8X1]  1. Pain of left clavicle  XR clavicle left      2. Sternal pain  XR chest pa & lateral          Patient Instructions     Patient Instructions   Bone remodeling seen from old healed left clavicular fractures.  No acute fracture seen.      Likely sternoclavicular joint sprain.  Xray appears negative for any fracture.  Will follow up with radiologist report when available.  Recommend elevating body part, icing the area every 2 hours for 20-30 minutes, take Ibuprofen every 6-8 hours to reduce inflammation.  If not improving over the next week, follow up with PCP or orthopedics.        Chief Complaint     Chief Complaint   Patient presents with    Clavicle Pain     Patient was jumping out of camper and left elbow got caught on the door and body weight pulled him down and left arm was jammed upwards. Having pain right below left clavicle. Happened last Wednesday. Had previous injury to left clavicle about 20 years ago. 4/10 pain but goes up to 8/10 at the end of the day.       History of Present Illness   58 year old male here with complaint of left clavicle and sternal pain after catching is left elbow on his camper when he was jumping out of it a week ago.  Notes history of prior clavicle fracture years ago and injury to his sternum.  Pain is at the sternoclavicular joint and some soreness along the clavicle with associated swelling.  No shortness of breath.  It does seem to be improving since last week but the area near his sternum is swollen.        Review of Systems   Review of Systems   Constitutional:  Negative for chills and fever.   Respiratory:  Negative for cough and shortness of breath.    Cardiovascular:  Negative for chest pain.   Musculoskeletal:         Left clavicular/sternal pain and injury, see HPI  "      Current Medications     Current Outpatient Medications:     acetaminophen (TYLENOL) 500 mg tablet, Take 500 mg by mouth every 6 (six) hours as needed for mild pain, Disp: , Rfl:     aspirin (ECOTRIN) 325 mg EC tablet, Take 1 tablet (325 mg total) by mouth 2 (two) times a day, Disp: 84 tablet, Rfl: 0    atorvastatin (LIPITOR) 10 mg tablet, Take 1 tablet (10 mg total) by mouth daily, Disp: 90 tablet, Rfl: 0    Boswellia-Glucosamine-Vit D (OSTEO BI-FLEX ONE PER DAY PO), Take by mouth, Disp: , Rfl:     Misc Natural Products (Turmeric Curcumin) CAPS, Take 1 tablet by mouth daily, Disp: , Rfl:     Multiple Vitamins-Minerals (Mens Multi Vitamin & Mineral) TABS, Take by mouth daily, Disp: , Rfl:     omega-3-acid ethyl esters (LOVAZA) 1 g capsule, Take 2 capsules (2 g total) by mouth 2 (two) times a day, Disp: 360 capsule, Rfl: 0    DULoxetine (CYMBALTA) 20 mg capsule, Take 1 capsule (20 mg total) by mouth daily, Disp: 90 capsule, Rfl: 1    Current Allergies     Allergies as of 03/18/2024    (No Known Allergies)          The following portions of the patient's history were reviewed and updated as appropriate: allergies, current medications, past family history, past medical history, past social history, past surgical history and problem list.   Past Medical History:   Diagnosis Date    Arthritis     degenerative    Chipped tooth     Chronic kidney disease     ruptured left kidney    Chronic pain disorder     general body pain    Dental crown present     History of fractured rib     \"2 stents in Aorta to repair the partial tear\" approx 4 yrs ago    Hyperlipidemia     Kidney stone     Teeth missing     Wears glasses      Past Surgical History:   Procedure Laterality Date    ANKLE FRACTURE SURGERY Left     FINGER AMPUTATION Left     4th    FRACTURE SURGERY Left     femur-with amanda implanted    KNEE ARTHROSCOPY Bilateral     KNEE ARTHROSCOPY W/ ACL RECONSTRUCTION      NASAL SEPTUM SURGERY Bilateral     OTHER SURGICAL " "HISTORY      per pt \"tear in aorta from rib fracture with 2 stents inserted approx 4 years ago @ [facility]\"    [place] CYSTO/URETERO W/LITHOTRIPSY &INDWELL STENT INSRT Left 04/05/2018    Procedure: CYSTOSCOPY URETEROSCOPY WITH LITHOTRIPSY HOLMIUM LASER, RETROGRADE PYELOGRAM AND INSERTION STENT URETERAL;  Surgeon: Shaji Sommer MD;  Location: [facility];  Service: Urology    CT OPEN TREATMENT CALCANEAL FRACTURE Right 9/11/2023    Procedure: OPEN REDUCTION W/ INTERNAL FIXATION (ORIF) CALCANEUS, Open reduction and internal fixation Navicular;  Surgeon: James R Lachman, MD;  Location:  MAIN OR;  Service: Orthopedics    WISDOM TOOTH EXTRACTION      and root canals    WRIST FRACTURE SURGERY Left     plates and screws implanted     Family History   Problem Relation Age of Onset    Diabetes Father     Diabetes Mother     No Known Problems Brother     No Known Problems Brother     Heart disease Brother         Pacemaker    No Known Problems Daughter     No Known Problems Daughter     No Known Problems Daughter      Social History     Socioeconomic History    Marital status:      Spouse name: Not on file    Number of children: Not on file    Years of education: Not on file    Highest education level: Not on file   Occupational History    Not on file   Tobacco Use    Smoking status: Never     Passive exposure: Past    Smokeless tobacco: Never   Vaping Use    Vaping status: Never Used   Substance and Sexual Activity    Alcohol use: No    Drug use: No    Sexual activity: Not on file     Comment: defer   Other Topics Concern    Not on file   Social History Narrative    Not on file     Social Determinants of Health     Financial Resource Strain: Not on file   Food Insecurity: Not on file   Transportation Needs: Not on file   Physical Activity: Not on file   Stress: Not on file   Social Connections: Not on file   Intimate Partner Violence: Not on file   Housing Stability: Not on file     Medications have been " verified.    Objective   /83   Pulse 72   Temp 97.7 °F (36.5 °C)   Resp 18   SpO2 98%      Physical Exam   Physical Exam  Vitals and nursing note reviewed.   Constitutional:       Appearance: Normal appearance.   Cardiovascular:      Rate and Rhythm: Normal rate and regular rhythm.      Pulses: Normal pulses.      Heart sounds: Normal heart sounds.   Pulmonary:      Effort: Pulmonary effort is normal.      Breath sounds: Normal breath sounds.   Musculoskeletal:      Left shoulder: Tenderness present. Decreased range of motion. Normal strength.        Arms:       Cervical back: Normal range of motion.      Comments: Swelling and tenderness over sternoclavicular joint   Neurological:      Mental Status: He is alert.

## 2024-03-18 NOTE — PATIENT INSTRUCTIONS
Bone remodeling seen from old healed left clavicular fractures.  No acute fracture seen.      Likely sternoclavicular joint sprain.  Xray appears negative for any fracture.  Will follow up with radiologist report when available.  Recommend elevating body part, icing the area every 2 hours for 20-30 minutes, take Ibuprofen every 6-8 hours to reduce inflammation.  If not improving over the next week, follow up with PCP or orthopedics.

## 2024-04-26 ENCOUNTER — CLINICAL SUPPORT (OUTPATIENT)
Dept: FAMILY MEDICINE CLINIC | Facility: CLINIC | Age: 59
End: 2024-04-26
Payer: COMMERCIAL

## 2024-04-26 DIAGNOSIS — Z11.1 SCREENING FOR TUBERCULOSIS: Primary | ICD-10-CM

## 2024-04-26 PROCEDURE — 86580 TB INTRADERMAL TEST: CPT

## 2024-04-29 ENCOUNTER — CLINICAL SUPPORT (OUTPATIENT)
Dept: FAMILY MEDICINE CLINIC | Facility: CLINIC | Age: 59
End: 2024-04-29

## 2024-04-29 DIAGNOSIS — Z11.1 ENCOUNTER FOR PPD SKIN TEST READING: Primary | ICD-10-CM

## 2024-04-29 LAB
INDURATION: 0 MM
TB SKIN TEST: NEGATIVE

## 2024-05-06 DIAGNOSIS — E78.2 MIXED HYPERLIPIDEMIA: ICD-10-CM

## 2024-05-06 RX ORDER — ATORVASTATIN CALCIUM 10 MG/1
10 TABLET, FILM COATED ORAL DAILY
Qty: 90 TABLET | Refills: 0 | Status: SHIPPED | OUTPATIENT
Start: 2024-05-06

## 2024-06-28 DIAGNOSIS — E78.2 MIXED HYPERLIPIDEMIA: ICD-10-CM

## 2024-06-28 RX ORDER — OMEGA-3-ACID ETHYL ESTERS 1 G/1
2 CAPSULE, LIQUID FILLED ORAL 2 TIMES DAILY
Qty: 360 CAPSULE | Refills: 0 | Status: SHIPPED | OUTPATIENT
Start: 2024-06-28 | End: 2024-09-26

## 2024-07-29 ENCOUNTER — TELEPHONE (OUTPATIENT)
Age: 59
End: 2024-07-29

## 2024-07-29 NOTE — TELEPHONE ENCOUNTER
PT called in wanting to confirm if he had pending labs to complete?     Did not see any labs listed in pt chart for collection. Confirmed with pt that he has no labs.     Please advise if pt will need labs to be completed before upcoming appt: 07/31 @ 8am    Fabián Mendez   [phone]

## 2024-07-30 DIAGNOSIS — Z12.5 SCREENING FOR PROSTATE CANCER: Primary | ICD-10-CM

## 2024-07-30 DIAGNOSIS — E78.2 MIXED HYPERLIPIDEMIA: ICD-10-CM

## 2024-07-31 ENCOUNTER — APPOINTMENT (OUTPATIENT)
Dept: LAB | Facility: CLINIC | Age: 59
End: 2024-07-31
Payer: COMMERCIAL

## 2024-07-31 ENCOUNTER — OFFICE VISIT (OUTPATIENT)
Dept: FAMILY MEDICINE CLINIC | Facility: CLINIC | Age: 59
End: 2024-07-31
Payer: COMMERCIAL

## 2024-07-31 VITALS
HEIGHT: 72 IN | HEART RATE: 58 BPM | DIASTOLIC BLOOD PRESSURE: 84 MMHG | WEIGHT: 184 LBS | TEMPERATURE: 97 F | BODY MASS INDEX: 24.92 KG/M2 | SYSTOLIC BLOOD PRESSURE: 132 MMHG | OXYGEN SATURATION: 99 %

## 2024-07-31 DIAGNOSIS — Z86.79 HISTORY OF REPAIR OF DISSECTING ANEURYSM OF DESCENDING THORACIC AORTA: ICD-10-CM

## 2024-07-31 DIAGNOSIS — E78.2 MIXED HYPERLIPIDEMIA: ICD-10-CM

## 2024-07-31 DIAGNOSIS — M25.50 POLYARTHRALGIA: Primary | ICD-10-CM

## 2024-07-31 DIAGNOSIS — Z98.890 HISTORY OF REPAIR OF DISSECTING ANEURYSM OF DESCENDING THORACIC AORTA: ICD-10-CM

## 2024-07-31 DIAGNOSIS — Z12.5 SCREENING FOR PROSTATE CANCER: ICD-10-CM

## 2024-07-31 DIAGNOSIS — G89.4 CHRONIC PAIN SYNDROME: ICD-10-CM

## 2024-07-31 LAB
ALBUMIN SERPL BCG-MCNC: 4.3 G/DL (ref 3.5–5)
ALP SERPL-CCNC: 73 U/L (ref 34–104)
ALT SERPL W P-5'-P-CCNC: 28 U/L (ref 7–52)
ANION GAP SERPL CALCULATED.3IONS-SCNC: 9 MMOL/L (ref 4–13)
AST SERPL W P-5'-P-CCNC: 21 U/L (ref 13–39)
BILIRUB SERPL-MCNC: 0.51 MG/DL (ref 0.2–1)
BUN SERPL-MCNC: 20 MG/DL (ref 5–25)
CALCIUM SERPL-MCNC: 9.8 MG/DL (ref 8.4–10.2)
CHLORIDE SERPL-SCNC: 105 MMOL/L (ref 96–108)
CHOLEST SERPL-MCNC: 209 MG/DL
CO2 SERPL-SCNC: 27 MMOL/L (ref 21–32)
CREAT SERPL-MCNC: 0.74 MG/DL (ref 0.6–1.3)
GFR SERPL CREATININE-BSD FRML MDRD: 101 ML/MIN/1.73SQ M
GLUCOSE P FAST SERPL-MCNC: 88 MG/DL (ref 65–99)
HDLC SERPL-MCNC: 56 MG/DL
LDLC SERPL CALC-MCNC: 122 MG/DL (ref 0–100)
POTASSIUM SERPL-SCNC: 4.1 MMOL/L (ref 3.5–5.3)
PROT SERPL-MCNC: 7.1 G/DL (ref 6.4–8.4)
PSA SERPL-MCNC: 0.8 NG/ML (ref 0–4)
SODIUM SERPL-SCNC: 141 MMOL/L (ref 135–147)
TRIGL SERPL-MCNC: 153 MG/DL

## 2024-07-31 PROCEDURE — 36415 COLL VENOUS BLD VENIPUNCTURE: CPT

## 2024-07-31 PROCEDURE — G0103 PSA SCREENING: HCPCS

## 2024-07-31 PROCEDURE — 80061 LIPID PANEL: CPT

## 2024-07-31 PROCEDURE — 99214 OFFICE O/P EST MOD 30 MIN: CPT | Performed by: PHYSICIAN ASSISTANT

## 2024-07-31 PROCEDURE — 80053 COMPREHEN METABOLIC PANEL: CPT

## 2024-07-31 RX ORDER — SODIUM FLUORIDE 6 MG/ML
PASTE, DENTIFRICE DENTAL DAILY
COMMUNITY
Start: 2024-05-06

## 2024-07-31 NOTE — PROGRESS NOTES
Ambulatory Visit  Name: Fabián Mendez      : 1965      MRN: [id number]  Encounter Provider: Andrew Willoughby PA-C  Encounter Date: 2024   Encounter department: [facility]    Assessment & Plan   1. Polyarthralgia  2. Chronic pain syndrome  3. Mixed hyperlipidemia  4. 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 Unremarkable exam. Continue statin. Reviewed vascular studies done 2024. Continue cymbalta. 6 month follow up, earlier prn    History of Present Illness     Pt presents for routine follow up  Labs in process  Remains on statin  Hx of renal stones, sometimes has back/flank pain but no urinary complaints or gross hematuria  Chronic post-traumatic arthritis pain controlled on with pain relief and Cymbalta. He notes a lot of stress but is managing this   He follows with vascular with hx of dissection s/p repair and has <50% stenosis of bilat carotid arteries. Remains on asa/statin. Stable, no change   Utd with CRC screening  PSA in process       Review of Systems   Constitutional:  Negative for chills, fatigue and fever.   HENT:  Negative for congestion, ear pain, hearing loss, nosebleeds, postnasal drip, rhinorrhea, sinus pressure, sinus pain, sneezing and sore throat.    Eyes:  Negative for pain, discharge, itching and visual disturbance.   Respiratory:  Negative for cough, chest tightness, shortness of breath and wheezing.    Cardiovascular:  Negative for chest pain, palpitations and leg swelling.   Gastrointestinal:  Negative for abdominal pain, blood in stool, constipation, diarrhea, nausea and vomiting.   Genitourinary:  Negative for frequency and urgency.   Musculoskeletal:  Positive for arthralgias.   Neurological:  Negative for dizziness, light-headedness and numbness.     Past Medical History:   Diagnosis Date    Arthritis     degenerative    Chipped tooth     Chronic kidney disease      "ruptured left kidney    Chronic pain disorder     general body pain    Dental crown present     History of fractured rib     \"2 stents in Aorta to repair the partial tear\" approx 4 yrs ago    Hyperlipidemia     Kidney stone     Teeth missing     Wears glasses      Past Surgical History:   Procedure Laterality Date    ANKLE FRACTURE SURGERY Left     FINGER AMPUTATION Left     4th    FRACTURE SURGERY Left     femur-with amanda implanted    KNEE ARTHROSCOPY Bilateral     KNEE ARTHROSCOPY W/ ACL RECONSTRUCTION      NASAL SEPTUM SURGERY Bilateral     OTHER SURGICAL HISTORY      per pt \"tear in aorta from rib fracture with 2 stents inserted approx 4 years ago @ [facility]\"    [place] CYSTO/URETERO W/LITHOTRIPSY &INDWELL STENT INSRT Left 04/05/2018    Procedure: CYSTOSCOPY URETEROSCOPY WITH LITHOTRIPSY HOLMIUM LASER, RETROGRADE PYELOGRAM AND INSERTION STENT URETERAL;  Surgeon: Shaji Sommer MD;  Location: [facility];  Service: Urology    [place] OPEN TREATMENT CALCANEAL FRACTURE Right 9/11/2023    Procedure: OPEN REDUCTION W/ INTERNAL FIXATION (ORIF) CALCANEUS, Open reduction and internal fixation Navicular;  Surgeon: James R Lachman, MD;  Location:  MAIN OR;  Service: Orthopedics    WISDOM TOOTH EXTRACTION      and root canals    WRIST FRACTURE SURGERY Left     plates and screws implanted     Family History   Problem Relation Age of Onset    Diabetes Father     Diabetes Mother     No Known Problems Brother     No Known Problems Brother     Heart disease Brother         Pacemaker    No Known Problems Daughter     No Known Problems Daughter     No Known Problems Daughter      Social History     Tobacco Use    Smoking status: Never     Passive exposure: Past    Smokeless tobacco: Never   Vaping Use    Vaping status: Never Used   Substance and Sexual Activity    Alcohol use: No    Drug use: No    Sexual activity: Not on file     Comment: defer     Current Outpatient Medications on File Prior to Visit   Medication Sig    " Sodium Fluoride 5000 PPM 1.1 % PSTE daily Use as directed    acetaminophen (TYLENOL) 500 mg tablet Take 500 mg by mouth every 6 (six) hours as needed for mild pain    aspirin (ECOTRIN) 325 mg EC tablet Take 1 tablet (325 mg total) by mouth 2 (two) times a day    atorvastatin (LIPITOR) 10 mg tablet take 1 tablet by mouth once daily    Boswellia-Glucosamine-Vit D (OSTEO BI-FLEX ONE PER DAY PO) Take by mouth    DULoxetine (CYMBALTA) 20 mg capsule Take 1 capsule (20 mg total) by mouth daily    Misc Natural Products (Turmeric Curcumin) CAPS Take 1 tablet by mouth daily    Multiple Vitamins-Minerals (Mens Multi Vitamin & Mineral) TABS Take by mouth daily    omega-3-acid ethyl esters (LOVAZA) 1 g capsule Take 2 capsules (2 g total) by mouth 2 (two) times a day     No Known Allergies  Immunization History   Administered Date(s) Administered    TD (adult) Preservative Free 10/19/2019    Tuberculin Skin Test-PPD Intradermal 04/26/2024     Objective     /84 (BP Location: Right arm, Patient Position: Sitting, Cuff Size: Adult)   Pulse 58   Temp (!) 97 °F (36.1 °C)   Ht 6' (1.829 m)   Wt 83.5 kg (184 lb)   SpO2 99%   BMI 24.95 kg/m²     Physical Exam  Vitals and nursing note reviewed.   Constitutional:       General: He is not in acute distress.     Appearance: He is well-developed.   HENT:      Head: Normocephalic and atraumatic.   Eyes:      Conjunctiva/sclera: Conjunctivae normal.   Cardiovascular:      Rate and Rhythm: Normal rate and regular rhythm.      Heart sounds: No murmur heard.  Pulmonary:      Effort: Pulmonary effort is normal. No respiratory distress.      Breath sounds: Normal breath sounds. No wheezing, rhonchi or rales.   Musculoskeletal:         General: No swelling.      Cervical back: Neck supple.   Skin:     General: Skin is warm and dry.      Capillary Refill: Capillary refill takes less than 2 seconds.   Neurological:      Mental Status: He is alert.   Psychiatric:         Mood and Affect:  Mood normal.

## 2024-08-08 DIAGNOSIS — E78.2 MIXED HYPERLIPIDEMIA: ICD-10-CM

## 2024-08-08 RX ORDER — ATORVASTATIN CALCIUM 10 MG/1
10 TABLET, FILM COATED ORAL DAILY
Qty: 90 TABLET | Refills: 0 | Status: SHIPPED | OUTPATIENT
Start: 2024-08-08

## 2024-09-05 ENCOUNTER — VBI (OUTPATIENT)
Dept: ADMINISTRATIVE | Facility: OTHER | Age: 59
End: 2024-09-05

## 2024-09-05 NOTE — TELEPHONE ENCOUNTER
09/05/24 6:42 AM     Chart reviewed for CRC: Colonoscopy was/were submitted to the patient's insurance.     Xiomara Sheppard   [facility]

## 2024-10-07 DIAGNOSIS — G89.4 CHRONIC PAIN SYNDROME: ICD-10-CM

## 2024-10-08 DIAGNOSIS — G89.4 CHRONIC PAIN SYNDROME: ICD-10-CM

## 2024-10-08 RX ORDER — DULOXETIN HYDROCHLORIDE 20 MG/1
20 CAPSULE, DELAYED RELEASE ORAL DAILY
Qty: 90 CAPSULE | Refills: 1 | OUTPATIENT
Start: 2024-10-08

## 2024-10-08 RX ORDER — DULOXETIN HYDROCHLORIDE 20 MG/1
20 CAPSULE, DELAYED RELEASE ORAL DAILY
Qty: 30 CAPSULE | Refills: 5 | Status: SHIPPED | OUTPATIENT
Start: 2024-10-08

## 2024-10-08 NOTE — TELEPHONE ENCOUNTER
Patient called for refill.    Advised waiting on provider's signature.  Patient expressed understanding.

## 2024-10-10 DIAGNOSIS — G89.4 CHRONIC PAIN SYNDROME: ICD-10-CM

## 2024-10-10 RX ORDER — DULOXETIN HYDROCHLORIDE 20 MG/1
20 CAPSULE, DELAYED RELEASE ORAL DAILY
Qty: 90 CAPSULE | Refills: 1 | Status: CANCELLED | OUTPATIENT
Start: 2024-10-10

## 2024-11-07 DIAGNOSIS — E78.2 MIXED HYPERLIPIDEMIA: ICD-10-CM

## 2024-11-07 DIAGNOSIS — G89.4 CHRONIC PAIN SYNDROME: ICD-10-CM

## 2024-11-07 RX ORDER — ATORVASTATIN CALCIUM 10 MG/1
10 TABLET, FILM COATED ORAL DAILY
Qty: 90 TABLET | Refills: 0 | Status: SHIPPED | OUTPATIENT
Start: 2024-11-07

## 2024-11-07 RX ORDER — DULOXETIN HYDROCHLORIDE 20 MG/1
20 CAPSULE, DELAYED RELEASE ORAL DAILY
Qty: 30 CAPSULE | Refills: 5 | Status: SHIPPED | OUTPATIENT
Start: 2024-11-07

## 2024-12-09 DIAGNOSIS — G89.4 CHRONIC PAIN SYNDROME: ICD-10-CM

## 2024-12-10 RX ORDER — DULOXETIN HYDROCHLORIDE 20 MG/1
20 CAPSULE, DELAYED RELEASE ORAL DAILY
Qty: 90 CAPSULE | Refills: 1 | Status: SHIPPED | OUTPATIENT
Start: 2024-12-10

## 2025-01-28 DIAGNOSIS — E78.2 MIXED HYPERLIPIDEMIA: Primary | ICD-10-CM

## 2025-01-29 ENCOUNTER — APPOINTMENT (OUTPATIENT)
Dept: LAB | Facility: CLINIC | Age: 60
End: 2025-01-29
Payer: COMMERCIAL

## 2025-01-29 DIAGNOSIS — E78.2 MIXED HYPERLIPIDEMIA: ICD-10-CM

## 2025-01-29 PROCEDURE — 80053 COMPREHEN METABOLIC PANEL: CPT

## 2025-01-29 PROCEDURE — 36415 COLL VENOUS BLD VENIPUNCTURE: CPT

## 2025-01-29 PROCEDURE — 80061 LIPID PANEL: CPT

## 2025-01-30 ENCOUNTER — RESULTS FOLLOW-UP (OUTPATIENT)
Dept: FAMILY MEDICINE CLINIC | Facility: CLINIC | Age: 60
End: 2025-01-30

## 2025-01-30 LAB
ALBUMIN SERPL BCG-MCNC: 4.4 G/DL (ref 3.5–5)
ALP SERPL-CCNC: 85 U/L (ref 34–104)
ALT SERPL W P-5'-P-CCNC: 23 U/L (ref 7–52)
ANION GAP SERPL CALCULATED.3IONS-SCNC: 10 MMOL/L (ref 4–13)
AST SERPL W P-5'-P-CCNC: 19 U/L (ref 13–39)
BILIRUB SERPL-MCNC: 0.48 MG/DL (ref 0.2–1)
BUN SERPL-MCNC: 24 MG/DL (ref 5–25)
CALCIUM SERPL-MCNC: 10 MG/DL (ref 8.4–10.2)
CHLORIDE SERPL-SCNC: 106 MMOL/L (ref 96–108)
CHOLEST SERPL-MCNC: 228 MG/DL (ref ?–200)
CO2 SERPL-SCNC: 27 MMOL/L (ref 21–32)
CREAT SERPL-MCNC: 0.88 MG/DL (ref 0.6–1.3)
GFR SERPL CREATININE-BSD FRML MDRD: 94 ML/MIN/1.73SQ M
GLUCOSE P FAST SERPL-MCNC: 81 MG/DL (ref 65–99)
HDLC SERPL-MCNC: 61 MG/DL
LDLC SERPL CALC-MCNC: 140 MG/DL (ref 0–100)
POTASSIUM SERPL-SCNC: 4.2 MMOL/L (ref 3.5–5.3)
PROT SERPL-MCNC: 7.4 G/DL (ref 6.4–8.4)
SODIUM SERPL-SCNC: 143 MMOL/L (ref 135–147)
TRIGL SERPL-MCNC: 134 MG/DL (ref ?–150)

## 2025-01-31 ENCOUNTER — OFFICE VISIT (OUTPATIENT)
Dept: FAMILY MEDICINE CLINIC | Facility: CLINIC | Age: 60
End: 2025-01-31
Payer: COMMERCIAL

## 2025-01-31 VITALS
DIASTOLIC BLOOD PRESSURE: 86 MMHG | WEIGHT: 191 LBS | SYSTOLIC BLOOD PRESSURE: 120 MMHG | TEMPERATURE: 96.8 F | OXYGEN SATURATION: 100 % | HEIGHT: 72 IN | HEART RATE: 66 BPM | BODY MASS INDEX: 25.87 KG/M2

## 2025-01-31 DIAGNOSIS — E78.2 MIXED HYPERLIPIDEMIA: ICD-10-CM

## 2025-01-31 DIAGNOSIS — Z12.11 SCREEN FOR COLON CANCER: ICD-10-CM

## 2025-01-31 DIAGNOSIS — Z00.00 HEALTH MAINTENANCE EXAMINATION: Primary | ICD-10-CM

## 2025-01-31 PROCEDURE — 99213 OFFICE O/P EST LOW 20 MIN: CPT | Performed by: PHYSICIAN ASSISTANT

## 2025-01-31 PROCEDURE — 99396 PREV VISIT EST AGE 40-64: CPT | Performed by: PHYSICIAN ASSISTANT

## 2025-01-31 RX ORDER — ATORVASTATIN CALCIUM 20 MG/1
20 TABLET, FILM COATED ORAL DAILY
Qty: 100 TABLET | Refills: 1 | Status: SHIPPED | OUTPATIENT
Start: 2025-01-31 | End: 2025-08-19

## 2025-01-31 NOTE — ASSESSMENT & PLAN NOTE
LDL above goal, increase lipitor to 20mg, recheck lipids and follow up in 6 months  Orders:  •  atorvastatin (LIPITOR) 20 mg tablet; Take 1 tablet (20 mg total) by mouth daily  •  Lipid Panel with Direct LDL reflex; Future  •  Comprehensive metabolic panel; Future

## 2025-01-31 NOTE — PROGRESS NOTES
Name: Fabián Mendez      : 1965      MRN: [id number]  Encounter Provider: Andrew Willoughby PA-C  Encounter Date: 2025   Encounter department: [facility]    Assessment & Plan  Health maintenance examination  Hx reviewed and updated. Unremarkable exam. Labs reviewed. Update HM. 6 month follow up, earlier prn       Mixed hyperlipidemia  LDL above goal, increase lipitor to 20mg, recheck lipids and follow up in 6 months  Orders:  •  atorvastatin (LIPITOR) 20 mg tablet; Take 1 tablet (20 mg total) by mouth daily  •  Lipid Panel with Direct LDL reflex; Future  •  Comprehensive metabolic panel; Future    Screen for colon cancer    Orders:  •  Ambulatory Referral to Gastroenterology; Future         History of Present Illness     Pt presents for annual physical, follow up. Labs reviewed as below  No interval changes  Utd with PSA  Due for CRC screening  FH reviewed, mom with beginning of dementia  Non smoker  No abuse/misuse of alcohol or illicit drugs  Meds/allergies reviewed       Review of Systems   Constitutional:  Negative for chills, fatigue and fever.   HENT:  Negative for congestion, ear pain, hearing loss, nosebleeds, postnasal drip, rhinorrhea, sinus pressure, sinus pain, sneezing and sore throat.    Eyes:  Negative for pain, discharge, itching and visual disturbance.   Respiratory:  Negative for cough, chest tightness, shortness of breath and wheezing.    Cardiovascular:  Negative for chest pain, palpitations and leg swelling.   Gastrointestinal:  Negative for abdominal pain, blood in stool, constipation, diarrhea, nausea and vomiting.   Genitourinary:  Negative for frequency and urgency.   Neurological:  Negative for dizziness, light-headedness and numbness.     Past Medical History:   Diagnosis Date   • Arthritis     degenerative   • Chipped tooth    • Chronic kidney disease     ruptured left kidney   • Chronic pain disorder     general body pain   • Dental crown present   "  • History of fractured rib     \"2 stents in Aorta to repair the partial tear\" approx 4 yrs ago   • Hyperlipidemia    • Kidney stone    • Teeth missing    • Wears glasses      Past Surgical History:   Procedure Laterality Date   • ANKLE FRACTURE SURGERY Left    • FINGER AMPUTATION Left     4th   • FRACTURE SURGERY Left     femur-with amanda implanted   • KNEE ARTHROSCOPY Bilateral    • KNEE ARTHROSCOPY W/ ACL RECONSTRUCTION     • NASAL SEPTUM SURGERY Bilateral    • OTHER SURGICAL HISTORY      per pt \"tear in aorta from rib fracture with 2 stents inserted approx 4 years ago @ [facility]\"   • [place] CYSTO/URETERO W/LITHOTRIPSY &INDWELL STENT INSRT Left 04/05/2018    Procedure: CYSTOSCOPY URETEROSCOPY WITH LITHOTRIPSY HOLMIUM LASER, RETROGRADE PYELOGRAM AND INSERTION STENT URETERAL;  Surgeon: Shaji Sommer MD;  Location: [facility];  Service: Urology   • [place] OPEN TREATMENT CALCANEAL FRACTURE Right 9/11/2023    Procedure: OPEN REDUCTION W/ INTERNAL FIXATION (ORIF) CALCANEUS, Open reduction and internal fixation Navicular;  Surgeon: James R Lachman, MD;  Location:  MAIN OR;  Service: Orthopedics   • WISDOM TOOTH EXTRACTION      and root canals   • WRIST FRACTURE SURGERY Left     plates and screws implanted     Family History   Problem Relation Age of Onset   • Diabetes Father    • Diabetes Mother    • No Known Problems Brother    • No Known Problems Brother    • Heart disease Brother         Pacemaker   • No Known Problems Daughter    • No Known Problems Daughter    • No Known Problems Daughter      Social History     Tobacco Use   • Smoking status: Never     Passive exposure: Past   • Smokeless tobacco: Never   Vaping Use   • Vaping status: Never Used   Substance and Sexual Activity   • Alcohol use: No   • Drug use: No   • Sexual activity: Not on file     Comment: defer     Current Outpatient Medications on File Prior to Visit   Medication Sig   • acetaminophen (TYLENOL) 500 mg tablet Take 500 mg by mouth " every 6 (six) hours as needed for mild pain   • aspirin (ECOTRIN) 325 mg EC tablet Take 1 tablet (325 mg total) by mouth 2 (two) times a day   • Boswellia-Glucosamine-Vit D (OSTEO BI-FLEX ONE PER DAY PO) Take by mouth   • DULoxetine (CYMBALTA) 20 mg capsule take 1 capsule by mouth once daily   • Misc Natural Products (Turmeric Curcumin) CAPS Take 1 tablet by mouth daily   • Multiple Vitamins-Minerals (Mens Multi Vitamin & Mineral) TABS Take by mouth daily   • omega-3-acid ethyl esters (LOVAZA) 1 g capsule Take 2 capsules (2 g total) by mouth 2 (two) times a day   • Sodium Fluoride 5000 PPM 1.1 % PSTE daily Use as directed   • [DISCONTINUED] atorvastatin (LIPITOR) 10 mg tablet Take 1 tablet (10 mg total) by mouth daily     No Known Allergies  Immunization History   Administered Date(s) Administered   • TD (adult) Preservative Free 10/19/2019   • Tuberculin Skin Test-PPD Intradermal 04/26/2024     Objective   /86 (BP Location: Right arm, Patient Position: Sitting, Cuff Size: Adult)   Pulse 66   Temp (!) 96.8 °F (36 °C)   Ht 6' (1.829 m)   Wt 86.6 kg (191 lb)   SpO2 100%   BMI 25.90 kg/m²     Physical Exam  Vitals and nursing note reviewed.   Constitutional:       General: He is not in acute distress.     Appearance: He is well-developed.   HENT:      Head: Normocephalic and atraumatic.   Eyes:      Conjunctiva/sclera: Conjunctivae normal.   Cardiovascular:      Rate and Rhythm: Normal rate and regular rhythm.      Heart sounds: No murmur heard.  Pulmonary:      Effort: Pulmonary effort is normal. No respiratory distress.      Breath sounds: Normal breath sounds. No wheezing, rhonchi or rales.   Abdominal:      Palpations: Abdomen is soft.      Tenderness: There is no abdominal tenderness.   Musculoskeletal:         General: No swelling.      Cervical back: Neck supple.   Skin:     General: Skin is warm and dry.      Capillary Refill: Capillary refill takes less than 2 seconds.   Neurological:      Mental  Status: He is alert.   Psychiatric:         Mood and Affect: Mood normal.

## 2025-02-04 ENCOUNTER — TELEPHONE (OUTPATIENT)
Age: 60
End: 2025-02-04

## 2025-02-04 ENCOUNTER — PREP FOR PROCEDURE (OUTPATIENT)
Age: 60
End: 2025-02-04

## 2025-02-04 DIAGNOSIS — Z12.11 SCREENING FOR COLON CANCER: Primary | ICD-10-CM

## 2025-02-04 NOTE — TELEPHONE ENCOUNTER
Scheduled date of colonoscopy (as of today):  2/21/25    Physician performing colonoscopy: Bryce    Location of colonoscopy:  [facility]    Bowel prep reviewed with patient:  EDUAR/JENNIFER    Instructions reviewed with patient by: hortensia    Clearances:  na    PLEASE MAIL INST. TO PATIENT'S HOME

## 2025-02-04 NOTE — TELEPHONE ENCOUNTER
02/04/25  Screened by: Rita Sibley MA    Referring Provider PCP    Pre- Screening:   BMI: 25.90 kg/m²     Has patient been referred for a routine screening Colonoscopy? yes  Is the patient between 45-75 years old? yes      Previous Colonoscopy yes   If yes:    Date: Approx 8 yrs    Facility:     Reason:       Does the patient want to see a Gastroenterologist prior to their procedure OR are they having any GI symptoms? no    Has the patient been hospitalized or had abdominal surgery in the past 6 months? no    Does the patient use supplemental oxygen? no    Does the patient take Coumadin, Lovenox, Plavix, Elliquis, Xarelto, or other blood thinning medication? no    Has the patient had a stroke, cardiac event, or stent placed in the past year? no

## 2025-03-06 ENCOUNTER — ANESTHESIA (OUTPATIENT)
Dept: GASTROENTEROLOGY | Facility: HOSPITAL | Age: 60
End: 2025-03-06
Payer: COMMERCIAL

## 2025-03-06 ENCOUNTER — ANESTHESIA EVENT (OUTPATIENT)
Dept: GASTROENTEROLOGY | Facility: HOSPITAL | Age: 60
End: 2025-03-06
Payer: COMMERCIAL

## 2025-03-06 ENCOUNTER — HOSPITAL ENCOUNTER (OUTPATIENT)
Dept: GASTROENTEROLOGY | Facility: HOSPITAL | Age: 60
Setting detail: OUTPATIENT SURGERY
End: 2025-03-06
Attending: INTERNAL MEDICINE
Payer: COMMERCIAL

## 2025-03-06 VITALS
OXYGEN SATURATION: 95 % | DIASTOLIC BLOOD PRESSURE: 85 MMHG | SYSTOLIC BLOOD PRESSURE: 123 MMHG | HEIGHT: 71 IN | RESPIRATION RATE: 18 BRPM | HEART RATE: 60 BPM | BODY MASS INDEX: 26.39 KG/M2 | WEIGHT: 188.49 LBS | TEMPERATURE: 97.8 F

## 2025-03-06 DIAGNOSIS — Z12.11 SCREENING FOR COLON CANCER: ICD-10-CM

## 2025-03-06 LAB — GLUCOSE SERPL-MCNC: 91 MG/DL (ref 65–140)

## 2025-03-06 PROCEDURE — 88305 TISSUE EXAM BY PATHOLOGIST: CPT | Performed by: STUDENT IN AN ORGANIZED HEALTH CARE EDUCATION/TRAINING PROGRAM

## 2025-03-06 PROCEDURE — 45385 COLONOSCOPY W/LESION REMOVAL: CPT | Performed by: INTERNAL MEDICINE

## 2025-03-06 PROCEDURE — 82948 REAGENT STRIP/BLOOD GLUCOSE: CPT

## 2025-03-06 RX ORDER — LIDOCAINE HYDROCHLORIDE 10 MG/ML
INJECTION, SOLUTION EPIDURAL; INFILTRATION; INTRACAUDAL; PERINEURAL AS NEEDED
Status: DISCONTINUED | OUTPATIENT
Start: 2025-03-06 | End: 2025-03-06

## 2025-03-06 RX ORDER — PROPOFOL 10 MG/ML
INJECTION, EMULSION INTRAVENOUS AS NEEDED
Status: DISCONTINUED | OUTPATIENT
Start: 2025-03-06 | End: 2025-03-06

## 2025-03-06 RX ORDER — SODIUM CHLORIDE, SODIUM LACTATE, POTASSIUM CHLORIDE, CALCIUM CHLORIDE 600; 310; 30; 20 MG/100ML; MG/100ML; MG/100ML; MG/100ML
INJECTION, SOLUTION INTRAVENOUS CONTINUOUS PRN
Status: DISCONTINUED | OUTPATIENT
Start: 2025-03-06 | End: 2025-03-06

## 2025-03-06 RX ADMIN — SODIUM CHLORIDE, SODIUM LACTATE, POTASSIUM CHLORIDE, AND CALCIUM CHLORIDE: .6; .31; .03; .02 INJECTION, SOLUTION INTRAVENOUS at 11:10

## 2025-03-06 RX ADMIN — PROPOFOL 150 MG: 10 INJECTION, EMULSION INTRAVENOUS at 11:15

## 2025-03-06 RX ADMIN — LIDOCAINE HYDROCHLORIDE 50 MG: 10 INJECTION, SOLUTION EPIDURAL; INFILTRATION; INTRACAUDAL; PERINEURAL at 11:15

## 2025-03-06 NOTE — ANESTHESIA PREPROCEDURE EVALUATION
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         Additional Monitors:     Airway Plan:            Plan Factors-Exercise tolerance (METS): >4 METS.    Chart reviewed. EKG reviewed. Imaging results reviewed. Existing labs reviewed. Patient summary reviewed.    Patient is not a current smoker.              Induction- intravenous.    Postoperative Plan-         Informed Consent- Anesthetic plan and risks discussed with patient.  I personally reviewed this patient with the CRNA. Discussed and agreed on the Anesthesia Plan with the CRNA..    NPO Status:  Vitals Value Taken Time   Date of last liquid 03/06/25 03/06/25 0956   Time of last liquid 0500 03/06/25 0956   Date of last solid 03/05/25 03/06/25 0956   Time of last solid 2355 03/06/25 0956

## 2025-03-06 NOTE — ANESTHESIA POSTPROCEDURE EVALUATION
Post-Op Assessment Note    CV Status:  Stable  Pain Score: 0    Pain management: adequate       Mental Status:  Alert and awake   Hydration Status:  Euvolemic   PONV Controlled:  Controlled   Airway Patency:  Patent     Post Op Vitals Reviewed: Yes    No anethesia notable event occurred.    Staff: CRNA, Anesthesiologist           Last Filed PACU Vitals:  Vitals Value Taken Time   Temp 97.8 °F (36.6 °C) 03/06/25 1128   Pulse 70 03/06/25 1128   /57 03/06/25 1128   Resp 18 03/06/25 1128   SpO2 96 % 03/06/25 1128       Modified Holli:     Vitals Value Taken Time   Activity 2 03/06/25 1128   Respiration 2 03/06/25 1128   Circulation 2 03/06/25 1128   Consciousness 2 03/06/25 1128   Oxygen Saturation 2 03/06/25 1128     Modified Holli Score: 10

## 2025-03-06 NOTE — H&P
"History and Physical -  [facility]  Fabián Mendez 59 y.o. male MRN: [id number]                  HPI: Fabián Mendez is a 59 y.o. year old male who presents for colonoscopy for colon cancer screening      REVIEW OF SYSTEMS: Per the HPI, and otherwise unremarkable.    Historical Information   Past Medical History:   Diagnosis Date    Arthritis     degenerative    Chipped tooth     Chronic kidney disease     ruptured left kidney    Chronic pain disorder     general body pain    Dental crown present     History of fractured rib     \"2 stents in Aorta to repair the partial tear\" approx 4 yrs ago    Hyperlipidemia     Kidney stone     Teeth missing     Wears glasses      Past Surgical History:   Procedure Laterality Date    ANKLE FRACTURE SURGERY Left     FINGER AMPUTATION Left     4th    FRACTURE SURGERY Left     femur-with amanda implanted    KNEE ARTHROSCOPY Bilateral     KNEE ARTHROSCOPY W/ ACL RECONSTRUCTION      NASAL SEPTUM SURGERY Bilateral     OTHER SURGICAL HISTORY      per pt \"tear in aorta from rib fracture with 2 stents inserted approx 4 years ago @ [facility]\"    SC CYSTO/URETERO W/LITHOTRIPSY &INDWELL STENT INSRT Left 04/05/2018    Procedure: CYSTOSCOPY URETEROSCOPY WITH LITHOTRIPSY HOLMIUM LASER, RETROGRADE PYELOGRAM AND INSERTION STENT URETERAL;  Surgeon: Shaji Sommer MD;  Location: [facility];  Service: Urology    SC OPEN TREATMENT CALCANEAL FRACTURE Right 9/11/2023    Procedure: OPEN REDUCTION W/ INTERNAL FIXATION (ORIF) CALCANEUS, Open reduction and internal fixation Navicular;  Surgeon: James R Lachman, MD;  Location:  MAIN OR;  Service: Orthopedics    WISDOM TOOTH EXTRACTION      and root canals    WRIST FRACTURE SURGERY Left     plates and screws implanted     Social History   Social History     Substance and Sexual Activity   Alcohol Use No     Social History     Substance and Sexual Activity   Drug Use No     Social History     Tobacco Use   Smoking " "Status Never    Passive exposure: Past   Smokeless Tobacco Never     Family History   Problem Relation Age of Onset    Diabetes Father     Diabetes Mother     No Known Problems Brother     No Known Problems Brother     Heart disease Brother         Pacemaker    No Known Problems Daughter     No Known Problems Daughter     No Known Problems Daughter        Meds/Allergies     Not in a hospital admission.    No Known Allergies    Objective     Blood pressure 103/57, pulse 80, temperature 97.5 °F (36.4 °C), temperature source Temporal, resp. rate 15, height 5' 11\" (1.803 m), weight 85.5 kg (188 lb 7.9 oz), SpO2 96%.      PHYSICAL EXAM    /57   Pulse 80   Temp 97.5 °F (36.4 °C) (Temporal)   Resp 15   Ht 5' 11\" (1.803 m)   Wt 85.5 kg (188 lb 7.9 oz)   SpO2 96%   BMI 26.29 kg/m²       Gen: NAD  CV: RRR  CHEST: Clear  ABD: soft, NT/ND  EXT: no edema      ASSESSMENT/PLAN:  This is a 59 y.o. year old male here for colonoscopy, and he is stable and optimized for his procedure.        "

## 2025-03-11 ENCOUNTER — RESULTS FOLLOW-UP (OUTPATIENT)
Dept: GASTROENTEROLOGY | Facility: CLINIC | Age: 60
End: 2025-03-11

## 2025-03-11 PROCEDURE — 88305 TISSUE EXAM BY PATHOLOGIST: CPT | Performed by: STUDENT IN AN ORGANIZED HEALTH CARE EDUCATION/TRAINING PROGRAM

## 2025-03-11 NOTE — TELEPHONE ENCOUNTER
----- Message from Lorene Hernandez PA-C sent at 3/11/2025  1:44 PM EDT -----  Please advise patient that the polyp removed was a benign adenoma.  He should have a follow-up colonoscopy in 3 years as previously advised by Dr. Wu  ----- Message -----  From: Lab, Background User  Sent: 3/6/2025  10:11 AM EDT  To: Rafiq Wu III, MD

## 2025-03-27 ENCOUNTER — HOSPITAL ENCOUNTER (OUTPATIENT)
Dept: CT IMAGING | Facility: HOSPITAL | Age: 60
End: 2025-03-27
Payer: COMMERCIAL

## 2025-03-27 ENCOUNTER — APPOINTMENT (OUTPATIENT)
Dept: LAB | Facility: CLINIC | Age: 60
End: 2025-03-27
Payer: COMMERCIAL

## 2025-03-27 ENCOUNTER — OFFICE VISIT (OUTPATIENT)
Dept: FAMILY MEDICINE CLINIC | Facility: CLINIC | Age: 60
End: 2025-03-27
Payer: COMMERCIAL

## 2025-03-27 ENCOUNTER — RESULTS FOLLOW-UP (OUTPATIENT)
Dept: FAMILY MEDICINE CLINIC | Facility: CLINIC | Age: 60
End: 2025-03-27

## 2025-03-27 VITALS
BODY MASS INDEX: 27.02 KG/M2 | SYSTOLIC BLOOD PRESSURE: 136 MMHG | TEMPERATURE: 97.4 F | HEIGHT: 71 IN | WEIGHT: 193 LBS | HEART RATE: 55 BPM | OXYGEN SATURATION: 98 % | DIASTOLIC BLOOD PRESSURE: 84 MMHG

## 2025-03-27 DIAGNOSIS — R31.9 HEMATURIA, UNSPECIFIED TYPE: ICD-10-CM

## 2025-03-27 DIAGNOSIS — N20.0 NEPHROLITHIASIS: Primary | ICD-10-CM

## 2025-03-27 DIAGNOSIS — R10.9 FLANK PAIN: ICD-10-CM

## 2025-03-27 DIAGNOSIS — R10.9 FLANK PAIN: Primary | ICD-10-CM

## 2025-03-27 DIAGNOSIS — E78.2 MIXED HYPERLIPIDEMIA: ICD-10-CM

## 2025-03-27 LAB
ALBUMIN SERPL BCG-MCNC: 4.5 G/DL (ref 3.5–5)
ALP SERPL-CCNC: 74 U/L (ref 34–104)
ALT SERPL W P-5'-P-CCNC: 24 U/L (ref 7–52)
ANION GAP SERPL CALCULATED.3IONS-SCNC: 8 MMOL/L (ref 4–13)
AST SERPL W P-5'-P-CCNC: 23 U/L (ref 13–39)
BASOPHILS # BLD AUTO: 0.08 THOUSANDS/ÂΜL (ref 0–0.1)
BASOPHILS NFR BLD AUTO: 1 % (ref 0–1)
BILIRUB SERPL-MCNC: 0.42 MG/DL (ref 0.2–1)
BUN SERPL-MCNC: 24 MG/DL (ref 5–25)
CALCIUM SERPL-MCNC: 9.6 MG/DL (ref 8.4–10.2)
CHLORIDE SERPL-SCNC: 108 MMOL/L (ref 96–108)
CHOLEST SERPL-MCNC: 181 MG/DL (ref ?–200)
CO2 SERPL-SCNC: 27 MMOL/L (ref 21–32)
CREAT SERPL-MCNC: 0.89 MG/DL (ref 0.6–1.3)
EOSINOPHIL # BLD AUTO: 0.42 THOUSAND/ÂΜL (ref 0–0.61)
EOSINOPHIL NFR BLD AUTO: 7 % (ref 0–6)
ERYTHROCYTE [DISTWIDTH] IN BLOOD BY AUTOMATED COUNT: 13.2 % (ref 11.6–15.1)
GFR SERPL CREATININE-BSD FRML MDRD: 94 ML/MIN/1.73SQ M
GLUCOSE P FAST SERPL-MCNC: 97 MG/DL (ref 65–99)
HCT VFR BLD AUTO: 46.2 % (ref 36.5–49.3)
HDLC SERPL-MCNC: 53 MG/DL
HGB BLD-MCNC: 15 G/DL (ref 12–17)
IMM GRANULOCYTES # BLD AUTO: 0.02 THOUSAND/UL (ref 0–0.2)
IMM GRANULOCYTES NFR BLD AUTO: 0 % (ref 0–2)
LDLC SERPL CALC-MCNC: 112 MG/DL (ref 0–100)
LYMPHOCYTES # BLD AUTO: 1.58 THOUSANDS/ÂΜL (ref 0.6–4.47)
LYMPHOCYTES NFR BLD AUTO: 26 % (ref 14–44)
MCH RBC QN AUTO: 31.3 PG (ref 26.8–34.3)
MCHC RBC AUTO-ENTMCNC: 32.5 G/DL (ref 31.4–37.4)
MCV RBC AUTO: 96 FL (ref 82–98)
MONOCYTES # BLD AUTO: 0.83 THOUSAND/ÂΜL (ref 0.17–1.22)
MONOCYTES NFR BLD AUTO: 13 % (ref 4–12)
NEUTROPHILS # BLD AUTO: 3.25 THOUSANDS/ÂΜL (ref 1.85–7.62)
NEUTS SEG NFR BLD AUTO: 53 % (ref 43–75)
NRBC BLD AUTO-RTO: 0 /100 WBCS
PLATELET # BLD AUTO: 303 THOUSANDS/UL (ref 149–390)
PMV BLD AUTO: 9.6 FL (ref 8.9–12.7)
POTASSIUM SERPL-SCNC: 4.6 MMOL/L (ref 3.5–5.3)
PROT SERPL-MCNC: 7.1 G/DL (ref 6.4–8.4)
RBC # BLD AUTO: 4.8 MILLION/UL (ref 3.88–5.62)
SL AMB  POCT GLUCOSE, UA: NEGATIVE
SL AMB LEUKOCYTE ESTERASE,UA: POSITIVE
SL AMB POCT BILIRUBIN,UA: NEGATIVE
SL AMB POCT BLOOD,UA: ABNORMAL
SL AMB POCT KETONES,UA: NEGATIVE
SL AMB POCT NITRITE,UA: NEGATIVE
SL AMB POCT PH,UA: 6.5
SL AMB POCT SPECIFIC GRAVITY,UA: 1.02
SL AMB POCT URINE PROTEIN: POSITIVE
SL AMB POCT UROBILINOGEN: 0.2
SODIUM SERPL-SCNC: 143 MMOL/L (ref 135–147)
TRIGL SERPL-MCNC: 80 MG/DL (ref ?–150)
WBC # BLD AUTO: 6.18 THOUSAND/UL (ref 4.31–10.16)

## 2025-03-27 PROCEDURE — 81003 URINALYSIS AUTO W/O SCOPE: CPT | Performed by: PHYSICIAN ASSISTANT

## 2025-03-27 PROCEDURE — 74176 CT ABD & PELVIS W/O CONTRAST: CPT

## 2025-03-27 PROCEDURE — 87086 URINE CULTURE/COLONY COUNT: CPT | Performed by: PHYSICIAN ASSISTANT

## 2025-03-27 PROCEDURE — 85025 COMPLETE CBC W/AUTO DIFF WBC: CPT

## 2025-03-27 PROCEDURE — 99214 OFFICE O/P EST MOD 30 MIN: CPT | Performed by: PHYSICIAN ASSISTANT

## 2025-03-27 PROCEDURE — 80053 COMPREHEN METABOLIC PANEL: CPT

## 2025-03-27 PROCEDURE — 36415 COLL VENOUS BLD VENIPUNCTURE: CPT

## 2025-03-27 PROCEDURE — 80061 LIPID PANEL: CPT

## 2025-03-27 RX ORDER — TAMSULOSIN HYDROCHLORIDE 0.4 MG/1
0.4 CAPSULE ORAL
Qty: 30 CAPSULE | Refills: 0 | Status: SHIPPED | OUTPATIENT
Start: 2025-03-27

## 2025-03-27 NOTE — PROGRESS NOTES
"Name: Fabián Mendez      : 1965      MRN: [id number]  Encounter Provider: Andrew Willoughby PA-C  Encounter Date: 3/27/2025   Encounter department: [facility]    Assessment & Plan  Flank pain  High suspicion for nephrolithiasis  UA with blood  Stat CT/labs  Orders:  •  POCT urine dip auto non-scope  •  Comprehensive metabolic panel; Future  •  CBC and differential; Future  •  CT renal stone study abdomen pelvis wo contrast; Future    Hematuria, unspecified type    Orders:  •  POCT urine dip auto non-scope  •  Comprehensive metabolic panel; Future  •  CBC and differential; Future  •  CT renal stone study abdomen pelvis wo contrast; Future         History of Present Illness     Pt presents with L flank pain, hematuria. Hx of nephrolithiasis. No fevers, N/V. Grossly bloody urine.       Review of Systems   Constitutional:  Negative for chills, fatigue and fever.   HENT:  Negative for congestion, ear pain, hearing loss, nosebleeds, postnasal drip, rhinorrhea, sinus pressure, sinus pain, sneezing and sore throat.    Eyes:  Negative for pain, discharge, itching and visual disturbance.   Respiratory:  Negative for cough, chest tightness, shortness of breath and wheezing.    Cardiovascular:  Negative for chest pain, palpitations and leg swelling.   Gastrointestinal:  Negative for abdominal pain, blood in stool, constipation, diarrhea, nausea and vomiting.   Genitourinary:  Positive for flank pain and hematuria. Negative for difficulty urinating, frequency and urgency.   Neurological:  Negative for dizziness, light-headedness and numbness.     Past Medical History:   Diagnosis Date   • Arthritis     degenerative   • Chipped tooth    • Chronic kidney disease     ruptured left kidney   • Chronic pain disorder     general body pain   • Dental crown present    • History of fractured rib     \"2 stents in Aorta to repair the partial tear\" approx 4 yrs ago   • Hyperlipidemia    • Kidney stone    • " "Teeth missing    • Wears glasses      Past Surgical History:   Procedure Laterality Date   • ANKLE FRACTURE SURGERY Left    • FINGER AMPUTATION Left     4th   • FRACTURE SURGERY Left     femur-with amanda implanted   • KNEE ARTHROSCOPY Bilateral    • KNEE ARTHROSCOPY W/ ACL RECONSTRUCTION     • NASAL SEPTUM SURGERY Bilateral    • OTHER SURGICAL HISTORY      per pt \"tear in aorta from rib fracture with 2 stents inserted approx 4 years ago @ [facility]\"   • LA CYSTO/URETERO W/LITHOTRIPSY &INDWELL STENT INSRT Left 04/05/2018    Procedure: CYSTOSCOPY URETEROSCOPY WITH LITHOTRIPSY HOLMIUM LASER, RETROGRADE PYELOGRAM AND INSERTION STENT URETERAL;  Surgeon: Shaji Sommer MD;  Location: [facility];  Service: Urology   • LA OPEN TREATMENT CALCANEAL FRACTURE Right 9/11/2023    Procedure: OPEN REDUCTION W/ INTERNAL FIXATION (ORIF) CALCANEUS, Open reduction and internal fixation Navicular;  Surgeon: James R Lachman, MD;  Location:  MAIN OR;  Service: Orthopedics   • WISDOM TOOTH EXTRACTION      and root canals   • WRIST FRACTURE SURGERY Left     plates and screws implanted     Family History   Problem Relation Age of Onset   • Diabetes Father    • Diabetes Mother    • No Known Problems Brother    • No Known Problems Brother    • Heart disease Brother         Pacemaker   • No Known Problems Daughter    • No Known Problems Daughter    • No Known Problems Daughter      Social History     Tobacco Use   • Smoking status: Never     Passive exposure: Past   • Smokeless tobacco: Never   Vaping Use   • Vaping status: Never Used   Substance and Sexual Activity   • Alcohol use: No   • Drug use: No   • Sexual activity: Not on file     Comment: defer     Current Outpatient Medications on File Prior to Visit   Medication Sig   • acetaminophen (TYLENOL) 500 mg tablet Take 500 mg by mouth every 6 (six) hours as needed for mild pain   • aspirin (ECOTRIN) 325 mg EC tablet Take 1 tablet (325 mg total) by mouth 2 (two) times a day " "(Patient taking differently: Take 81 mg by mouth once)   • atorvastatin (LIPITOR) 20 mg tablet Take 1 tablet (20 mg total) by mouth daily   • Boswellia-Glucosamine-Vit D (OSTEO BI-FLEX ONE PER DAY PO) Take by mouth   • DULoxetine (CYMBALTA) 20 mg capsule take 1 capsule by mouth once daily   • Misc Natural Products (Turmeric Curcumin) CAPS Take 1 tablet by mouth daily   • Multiple Vitamins-Minerals (Mens Multi Vitamin & Mineral) TABS Take by mouth daily   • omega-3-acid ethyl esters (LOVAZA) 1 g capsule Take 2 capsules (2 g total) by mouth 2 (two) times a day   • Sodium Fluoride 5000 PPM 1.1 % PSTE daily Use as directed     No Known Allergies  Immunization History   Administered Date(s) Administered   • TD (adult) Preservative Free 10/19/2019   • Tuberculin Skin Test-PPD Intradermal 04/26/2024     Objective   /84   Pulse 55   Temp (!) 97.4 °F (36.3 °C)   Ht 5' 11\" (1.803 m)   Wt 87.5 kg (193 lb)   SpO2 98%   BMI 26.92 kg/m²     Physical Exam  Vitals and nursing note reviewed.   Constitutional:       General: He is not in acute distress.     Appearance: He is well-developed.   HENT:      Head: Normocephalic and atraumatic.   Cardiovascular:      Rate and Rhythm: Normal rate and regular rhythm.      Heart sounds: No murmur heard.  Pulmonary:      Effort: Pulmonary effort is normal. No respiratory distress.      Breath sounds: Normal breath sounds.   Abdominal:      Palpations: Abdomen is soft.      Tenderness: There is abdominal tenderness (flank pain). There is left CVA tenderness. There is no right CVA tenderness.   Musculoskeletal:         General: No swelling.      Cervical back: Neck supple.   Skin:     General: Skin is warm and dry.      Capillary Refill: Capillary refill takes less than 2 seconds.   Neurological:      Mental Status: He is alert.         "

## 2025-03-28 LAB — BACTERIA UR CULT: NORMAL

## 2025-04-24 ENCOUNTER — DOCUMENTATION (OUTPATIENT)
Dept: ADMINISTRATIVE | Facility: OTHER | Age: 60
End: 2025-04-24

## 2025-04-24 NOTE — PROGRESS NOTES
04/24/25 10:55 AM    CRC outreach is not required, Colonoscopy/ FIT/ other screening completed.    Thank you.  Michelle Campuzano MA  [facility]

## 2025-05-19 DIAGNOSIS — G89.4 CHRONIC PAIN SYNDROME: ICD-10-CM

## 2025-05-19 RX ORDER — DULOXETIN HYDROCHLORIDE 20 MG/1
20 CAPSULE, DELAYED RELEASE ORAL DAILY
Qty: 90 CAPSULE | Refills: 1 | Status: SHIPPED | OUTPATIENT
Start: 2025-05-19

## 2025-07-28 DIAGNOSIS — E78.2 MIXED HYPERLIPIDEMIA: ICD-10-CM

## 2025-07-29 ENCOUNTER — OFFICE VISIT (OUTPATIENT)
Dept: FAMILY MEDICINE CLINIC | Facility: CLINIC | Age: 60
End: 2025-07-29
Payer: COMMERCIAL

## 2025-07-29 ENCOUNTER — APPOINTMENT (OUTPATIENT)
Dept: LAB | Facility: CLINIC | Age: 60
End: 2025-07-29
Payer: COMMERCIAL

## 2025-07-29 VITALS
HEIGHT: 71 IN | BODY MASS INDEX: 25.48 KG/M2 | TEMPERATURE: 97.2 F | DIASTOLIC BLOOD PRESSURE: 78 MMHG | SYSTOLIC BLOOD PRESSURE: 126 MMHG | OXYGEN SATURATION: 99 % | HEART RATE: 68 BPM | WEIGHT: 182 LBS

## 2025-07-29 DIAGNOSIS — M25.50 POLYARTHRALGIA: ICD-10-CM

## 2025-07-29 DIAGNOSIS — G89.4 CHRONIC PAIN SYNDROME: ICD-10-CM

## 2025-07-29 DIAGNOSIS — E78.2 MIXED HYPERLIPIDEMIA: Primary | ICD-10-CM

## 2025-07-29 DIAGNOSIS — Z12.5 SCREENING FOR PROSTATE CANCER: ICD-10-CM

## 2025-07-29 DIAGNOSIS — I65.23 BILATERAL CAROTID ARTERY STENOSIS: ICD-10-CM

## 2025-07-29 DIAGNOSIS — I73.9 SUBCLAVIAN ARTERY DISEASE (HCC): ICD-10-CM

## 2025-07-29 DIAGNOSIS — E78.2 MIXED HYPERLIPIDEMIA: ICD-10-CM

## 2025-07-29 LAB
ALBUMIN SERPL BCG-MCNC: 4.4 G/DL (ref 3.5–5)
ALP SERPL-CCNC: 82 U/L (ref 34–104)
ALT SERPL W P-5'-P-CCNC: 20 U/L (ref 7–52)
ANION GAP SERPL CALCULATED.3IONS-SCNC: 11 MMOL/L (ref 4–13)
AST SERPL W P-5'-P-CCNC: 21 U/L (ref 13–39)
BILIRUB SERPL-MCNC: 0.64 MG/DL (ref 0.2–1)
BUN SERPL-MCNC: 19 MG/DL (ref 5–25)
CALCIUM SERPL-MCNC: 9.6 MG/DL (ref 8.4–10.2)
CHLORIDE SERPL-SCNC: 108 MMOL/L (ref 96–108)
CHOLEST SERPL-MCNC: 163 MG/DL (ref ?–200)
CO2 SERPL-SCNC: 24 MMOL/L (ref 21–32)
CREAT SERPL-MCNC: 0.82 MG/DL (ref 0.6–1.3)
GFR SERPL CREATININE-BSD FRML MDRD: 96 ML/MIN/1.73SQ M
GLUCOSE P FAST SERPL-MCNC: 89 MG/DL (ref 65–99)
HDLC SERPL-MCNC: 48 MG/DL
LDLC SERPL CALC-MCNC: 90 MG/DL (ref 0–100)
POTASSIUM SERPL-SCNC: 4.1 MMOL/L (ref 3.5–5.3)
PROT SERPL-MCNC: 7.2 G/DL (ref 6.4–8.4)
PSA SERPL-MCNC: 0.75 NG/ML (ref 0–4)
SODIUM SERPL-SCNC: 143 MMOL/L (ref 135–147)
TRIGL SERPL-MCNC: 123 MG/DL (ref ?–150)

## 2025-07-29 PROCEDURE — G0103 PSA SCREENING: HCPCS

## 2025-07-29 PROCEDURE — 80061 LIPID PANEL: CPT

## 2025-07-29 PROCEDURE — 80053 COMPREHEN METABOLIC PANEL: CPT

## 2025-07-29 PROCEDURE — 99214 OFFICE O/P EST MOD 30 MIN: CPT | Performed by: PHYSICIAN ASSISTANT

## 2025-07-29 PROCEDURE — 36415 COLL VENOUS BLD VENIPUNCTURE: CPT

## 2025-07-29 RX ORDER — OMEGA-3-ACID ETHYL ESTERS 1 G/1
2 CAPSULE, LIQUID FILLED ORAL 2 TIMES DAILY
Qty: 360 CAPSULE | Refills: 0 | OUTPATIENT
Start: 2025-07-29

## 2025-07-29 RX ORDER — ASPIRIN 81 MG/1
81 TABLET, CHEWABLE ORAL DAILY
COMMUNITY

## 2025-07-29 RX ORDER — OMEGA-3-ACID ETHYL ESTERS 1 G/1
2 CAPSULE, LIQUID FILLED ORAL 2 TIMES DAILY
Qty: 360 CAPSULE | Refills: 0 | Status: SHIPPED | OUTPATIENT
Start: 2025-07-29 | End: 2025-10-27

## 2025-08-04 DIAGNOSIS — E78.2 MIXED HYPERLIPIDEMIA: ICD-10-CM

## 2025-08-04 RX ORDER — ATORVASTATIN CALCIUM 20 MG/1
20 TABLET, FILM COATED ORAL DAILY
Qty: 100 TABLET | Refills: 1 | Status: SHIPPED | OUTPATIENT
Start: 2025-08-04 | End: 2026-02-20

## 2025-08-06 RX ORDER — ATORVASTATIN CALCIUM 20 MG/1
20 TABLET, FILM COATED ORAL DAILY
Qty: 100 TABLET | Refills: 0 | OUTPATIENT
Start: 2025-08-06

## (undated) DEVICE — GLOVE SRG BIOGEL 7

## (undated) DEVICE — INVIEW CLEAR LEGGINGS: Brand: CONVERTORS

## (undated) DEVICE — COUNTERSINK FOR SCREWS 2.7,3.5MM: Brand: VARIAX

## (undated) DEVICE — BULB SYRINGE,IRRIGATION WITH PROTECTIVE CAP: Brand: DOVER

## (undated) DEVICE — BETHLEHEM UNIV MAJ EXT ,KIT: Brand: CARDINAL HEALTH

## (undated) DEVICE — 3M™ DURAPORE™ SURGICAL TAPE 1538-1, 1 INCH X 10 YARD (2,5CM X 9,1M), 12 ROLLS/BOX: Brand: 3M™ DURAPORE™

## (undated) DEVICE — WASHER
Type: IMPLANTABLE DEVICE | Site: FOOT | Status: NON-FUNCTIONAL
Removed: 2023-09-11

## (undated) DEVICE — LASER HOLMIUM FIBER 365 MIC

## (undated) DEVICE — SUT VICRYL 2-0 CT-2 27 IN J269H

## (undated) DEVICE — BASKET STONE RTRVL ZERO TIP 2.4FR

## (undated) DEVICE — SPLINT 5 X 30 IN FAST SET PLASTER

## (undated) DEVICE — 3M™ TEGADERM™ TRANSPARENT FILM DRESSING FRAME STYLE, 1624W, 2-3/8 IN X 2-3/4 IN (6 CM X 7 CM), 100/CT 4CT/CASE: Brand: 3M™ TEGADERM™

## (undated) DEVICE — ACE WRAP 6 IN UNSTERILE

## (undated) DEVICE — SPONGE SCRUB 4 PCT CHLORHEXIDINE

## (undated) DEVICE — NEPTUNE E-SEP SMOKE EVACUATION PENCIL, COATED, 70MM BLADE, PUSH BUTTON SWITCH: Brand: NEPTUNE E-SEP

## (undated) DEVICE — X-RAY DETECTABLE SPONGES,16 PLY: Brand: VISTEC

## (undated) DEVICE — GUIDEWIRE STRGHT TIP 0.035 IN  SOLO PLUS

## (undated) DEVICE — GUIDE WIRE

## (undated) DEVICE — LIGHT HANDLE COVER SLEEVE DISP BLUE STELLAR

## (undated) DEVICE — CATH URETERAL 5FR X 70 CM FLEX TIP POLYUR BARD

## (undated) DEVICE — INTENDED FOR TISSUE SEPARATION, AND OTHER PROCEDURES THAT REQUIRE A SHARP SURGICAL BLADE TO PUNCTURE OR CUT.: Brand: BARD-PARKER SAFETY BLADES SIZE 15, STERILE

## (undated) DEVICE — CUFF TOURNIQUET 30 X 4 IN QUICK CONNECT DISP 1BLA

## (undated) DEVICE — GLOVE SRG BIOGEL 8

## (undated) DEVICE — REDUCTION PIN, CANCELLOUS THREAD
Type: IMPLANTABLE DEVICE | Site: FOOT | Status: NON-FUNCTIONAL
Brand: VARIAX
Removed: 2023-09-11

## (undated) DEVICE — SUT VICRYL 4-0 PS-2 27 IN J426H

## (undated) DEVICE — SCD SEQUENTIAL COMPRESSION COMFORT SLEEVE MEDIUM KNEE LENGTH: Brand: KENDALL SCD

## (undated) DEVICE — UNTHREADED GUIDE WIRE
Type: IMPLANTABLE DEVICE | Site: FOOT | Status: NON-FUNCTIONAL
Brand: FIXOS
Removed: 2023-09-11

## (undated) DEVICE — DRAPE SHEET THREE QUARTER

## (undated) DEVICE — PACK TUR

## (undated) DEVICE — DRAPE C-ARM 48 X 84 IN F/ OEC MINIVIEW 6800

## (undated) DEVICE — MEDI-VAC YANKAUER SUCTION HANDLE W/BULBOUS AND CONTROL VENT: Brand: CARDINAL HEALTH

## (undated) DEVICE — BANDAGE, ESMARK LF STR 4"X9'(20/CS): Brand: CYPRESS

## (undated) DEVICE — SUT ETHILON 3-0 PS-1 18 IN 1663H

## (undated) DEVICE — ABDOMINAL PAD: Brand: DERMACEA

## (undated) DEVICE — LUBRICANT SURGILUBE TUBE 4 OZ  FLIP TOP

## (undated) DEVICE — DRILL BIT, AO DIA2.6MM X 135MM, SCALED: Brand: VARIAX

## (undated) DEVICE — DRESSING XEROFORM 5 X 9

## (undated) DEVICE — DISPOSABLE OR TOWEL: Brand: CARDINAL HEALTH

## (undated) DEVICE — CAST PADDING 4 IN UNSTERILE

## (undated) DEVICE — GAUZE SPONGES,16 PLY: Brand: CURITY

## (undated) DEVICE — UROCATCH BAG

## (undated) DEVICE — CHLORHEXIDINE 4PCT 4 OZ

## (undated) DEVICE — OVERDRILL AO, DIA3.5MM X 122MM: Brand: VARIAX

## (undated) DEVICE — INTENDED FOR TISSUE SEPARATION, AND OTHER PROCEDURES THAT REQUIRE A SHARP SURGICAL BLADE TO PUNCTURE OR CUT.: Brand: BARD-PARKER ® CARBON RIB-BACK BLADES

## (undated) DEVICE — CAST PADDING 4 IN STERILE

## (undated) DEVICE — GLOVE INDICATOR PI UNDERGLOVE SZ 8 BLUE

## (undated) DEVICE — HOLDING PIN
Type: IMPLANTABLE DEVICE | Site: FOOT | Status: NON-FUNCTIONAL
Brand: ANCHORAGE
Removed: 2023-09-11

## (undated) DEVICE — CHLORAPREP HI-LITE 26ML ORANGE

## (undated) DEVICE — 10FR FRAZIER SUCTION HANDLE: Brand: CARDINAL HEALTH